# Patient Record
Sex: MALE | Race: WHITE | NOT HISPANIC OR LATINO
[De-identification: names, ages, dates, MRNs, and addresses within clinical notes are randomized per-mention and may not be internally consistent; named-entity substitution may affect disease eponyms.]

---

## 2016-02-18 RX ORDER — DOCUSATE SODIUM 100 MG
1 CAPSULE ORAL
Qty: 0 | Refills: 0 | COMMUNITY
Start: 2016-02-18

## 2017-03-02 ENCOUNTER — APPOINTMENT (OUTPATIENT)
Dept: OPHTHALMOLOGY | Facility: CLINIC | Age: 82
End: 2017-03-02

## 2017-09-14 ENCOUNTER — APPOINTMENT (OUTPATIENT)
Dept: OPHTHALMOLOGY | Facility: CLINIC | Age: 82
End: 2017-09-14
Payer: MEDICARE

## 2017-09-14 PROCEDURE — 99213 OFFICE O/P EST LOW 20 MIN: CPT

## 2017-11-08 ENCOUNTER — APPOINTMENT (OUTPATIENT)
Dept: SURGERY | Facility: CLINIC | Age: 82
End: 2017-11-08
Payer: MEDICARE

## 2017-11-08 VITALS — HEIGHT: 71 IN | TEMPERATURE: 98 F | BODY MASS INDEX: 22.68 KG/M2 | WEIGHT: 162 LBS

## 2017-11-08 VITALS — DIASTOLIC BLOOD PRESSURE: 66 MMHG | HEART RATE: 51 BPM | SYSTOLIC BLOOD PRESSURE: 114 MMHG

## 2017-11-08 PROCEDURE — 99202 OFFICE O/P NEW SF 15 MIN: CPT

## 2017-12-14 ENCOUNTER — APPOINTMENT (OUTPATIENT)
Dept: OPHTHALMOLOGY | Facility: CLINIC | Age: 82
End: 2017-12-14
Payer: MEDICARE

## 2017-12-14 DIAGNOSIS — H35.3130 NONEXUDATIVE AGE-RELATED MACULAR DEGENERATION, BILATERAL, STAGE UNSPECIFIED: ICD-10-CM

## 2017-12-14 PROCEDURE — 92015 DETERMINE REFRACTIVE STATE: CPT

## 2017-12-14 PROCEDURE — 92014 COMPRE OPH EXAM EST PT 1/>: CPT

## 2017-12-31 ENCOUNTER — INPATIENT (INPATIENT)
Facility: HOSPITAL | Age: 82
LOS: 2 days | End: 2018-01-03
Attending: STUDENT IN AN ORGANIZED HEALTH CARE EDUCATION/TRAINING PROGRAM | Admitting: STUDENT IN AN ORGANIZED HEALTH CARE EDUCATION/TRAINING PROGRAM
Payer: MEDICARE

## 2017-12-31 VITALS
DIASTOLIC BLOOD PRESSURE: 63 MMHG | RESPIRATION RATE: 18 BRPM | SYSTOLIC BLOOD PRESSURE: 96 MMHG | OXYGEN SATURATION: 97 % | HEART RATE: 72 BPM

## 2017-12-31 DIAGNOSIS — R73.9 HYPERGLYCEMIA, UNSPECIFIED: ICD-10-CM

## 2017-12-31 DIAGNOSIS — Z29.9 ENCOUNTER FOR PROPHYLACTIC MEASURES, UNSPECIFIED: ICD-10-CM

## 2017-12-31 DIAGNOSIS — A41.89 OTHER SPECIFIED SEPSIS: ICD-10-CM

## 2017-12-31 DIAGNOSIS — N17.9 ACUTE KIDNEY FAILURE, UNSPECIFIED: ICD-10-CM

## 2017-12-31 DIAGNOSIS — I50.43 ACUTE ON CHRONIC COMBINED SYSTOLIC (CONGESTIVE) AND DIASTOLIC (CONGESTIVE) HEART FAILURE: ICD-10-CM

## 2017-12-31 DIAGNOSIS — E03.9 HYPOTHYROIDISM, UNSPECIFIED: ICD-10-CM

## 2017-12-31 DIAGNOSIS — I44.7 LEFT BUNDLE-BRANCH BLOCK, UNSPECIFIED: ICD-10-CM

## 2017-12-31 DIAGNOSIS — J12.9 VIRAL PNEUMONIA, UNSPECIFIED: ICD-10-CM

## 2017-12-31 DIAGNOSIS — R53.83 OTHER FATIGUE: ICD-10-CM

## 2017-12-31 DIAGNOSIS — G93.41 METABOLIC ENCEPHALOPATHY: ICD-10-CM

## 2017-12-31 DIAGNOSIS — Z71.89 OTHER SPECIFIED COUNSELING: ICD-10-CM

## 2017-12-31 LAB
ALBUMIN SERPL ELPH-MCNC: 3.8 G/DL — SIGNIFICANT CHANGE UP (ref 3.3–5)
ALP SERPL-CCNC: 111 U/L — SIGNIFICANT CHANGE UP (ref 40–120)
ALT FLD-CCNC: 16 U/L — SIGNIFICANT CHANGE UP (ref 4–41)
ANISOCYTOSIS BLD QL: SLIGHT — SIGNIFICANT CHANGE UP
APPEARANCE UR: SIGNIFICANT CHANGE UP
APTT BLD: 28.3 SEC — SIGNIFICANT CHANGE UP (ref 27.5–37.4)
AST SERPL-CCNC: 27 U/L — SIGNIFICANT CHANGE UP (ref 4–40)
B PERT DNA SPEC QL NAA+PROBE: SIGNIFICANT CHANGE UP
B-OH-BUTYR SERPL-SCNC: 0.2 MMOL/L — SIGNIFICANT CHANGE UP (ref 0–0.4)
BASE EXCESS BLDV CALC-SCNC: -1.9 MMOL/L — SIGNIFICANT CHANGE UP
BASE EXCESS BLDV CALC-SCNC: -2.7 MMOL/L — SIGNIFICANT CHANGE UP
BASE EXCESS BLDV CALC-SCNC: -3.6 MMOL/L — SIGNIFICANT CHANGE UP
BASOPHILS # BLD AUTO: 0.04 K/UL — SIGNIFICANT CHANGE UP (ref 0–0.2)
BASOPHILS NFR BLD AUTO: 0.3 % — SIGNIFICANT CHANGE UP (ref 0–2)
BASOPHILS NFR SPEC: 0 % — SIGNIFICANT CHANGE UP (ref 0–2)
BILIRUB SERPL-MCNC: 0.5 MG/DL — SIGNIFICANT CHANGE UP (ref 0.2–1.2)
BILIRUB UR-MCNC: NEGATIVE — SIGNIFICANT CHANGE UP
BLASTS # FLD: 0 % — SIGNIFICANT CHANGE UP (ref 0–0)
BLOOD GAS VENOUS - CREATININE: 0.89 MG/DL — SIGNIFICANT CHANGE UP (ref 0.5–1.3)
BLOOD GAS VENOUS - CREATININE: 0.95 MG/DL — SIGNIFICANT CHANGE UP (ref 0.5–1.3)
BLOOD GAS VENOUS - CREATININE: 1.16 MG/DL — SIGNIFICANT CHANGE UP (ref 0.5–1.3)
BLOOD UR QL VISUAL: NEGATIVE — SIGNIFICANT CHANGE UP
BUN SERPL-MCNC: 30 MG/DL — HIGH (ref 7–23)
BUN SERPL-MCNC: 32 MG/DL — HIGH (ref 7–23)
C PNEUM DNA SPEC QL NAA+PROBE: NOT DETECTED — SIGNIFICANT CHANGE UP
CALCIUM SERPL-MCNC: 10.1 MG/DL — SIGNIFICANT CHANGE UP (ref 8.4–10.5)
CALCIUM SERPL-MCNC: 9.4 MG/DL — SIGNIFICANT CHANGE UP (ref 8.4–10.5)
CHLORIDE BLDV-SCNC: 102 MMOL/L — SIGNIFICANT CHANGE UP (ref 96–108)
CHLORIDE BLDV-SCNC: 103 MMOL/L — SIGNIFICANT CHANGE UP (ref 96–108)
CHLORIDE BLDV-SCNC: 99 MMOL/L — SIGNIFICANT CHANGE UP (ref 96–108)
CHLORIDE SERPL-SCNC: 95 MMOL/L — LOW (ref 98–107)
CHLORIDE SERPL-SCNC: 99 MMOL/L — SIGNIFICANT CHANGE UP (ref 98–107)
CK MB BLD-MCNC: 4.65 NG/ML — SIGNIFICANT CHANGE UP (ref 1–6.6)
CK MB BLD-MCNC: SIGNIFICANT CHANGE UP (ref 0–2.5)
CK SERPL-CCNC: 84 U/L — SIGNIFICANT CHANGE UP (ref 30–200)
CO2 SERPL-SCNC: 20 MMOL/L — LOW (ref 22–31)
CO2 SERPL-SCNC: 22 MMOL/L — SIGNIFICANT CHANGE UP (ref 22–31)
COLOR SPEC: YELLOW — SIGNIFICANT CHANGE UP
CREAT SERPL-MCNC: 1.03 MG/DL — SIGNIFICANT CHANGE UP (ref 0.5–1.3)
CREAT SERPL-MCNC: 1.22 MG/DL — SIGNIFICANT CHANGE UP (ref 0.5–1.3)
EOSINOPHIL # BLD AUTO: 0.01 K/UL — SIGNIFICANT CHANGE UP (ref 0–0.5)
EOSINOPHIL NFR BLD AUTO: 0.1 % — SIGNIFICANT CHANGE UP (ref 0–6)
EOSINOPHIL NFR FLD: 0 % — SIGNIFICANT CHANGE UP (ref 0–6)
FLUAV H1 2009 PAND RNA SPEC QL NAA+PROBE: NOT DETECTED — SIGNIFICANT CHANGE UP
FLUAV H1 RNA SPEC QL NAA+PROBE: NOT DETECTED — SIGNIFICANT CHANGE UP
FLUAV H3 RNA SPEC QL NAA+PROBE: NOT DETECTED — SIGNIFICANT CHANGE UP
FLUAV SUBTYP SPEC NAA+PROBE: SIGNIFICANT CHANGE UP
FLUBV RNA SPEC QL NAA+PROBE: NOT DETECTED — SIGNIFICANT CHANGE UP
GAS PNL BLDV: 130 MMOL/L — LOW (ref 136–146)
GAS PNL BLDV: 131 MMOL/L — LOW (ref 136–146)
GAS PNL BLDV: 133 MMOL/L — LOW (ref 136–146)
GIANT PLATELETS BLD QL SMEAR: PRESENT — SIGNIFICANT CHANGE UP
GLUCOSE BLDV-MCNC: 302 — HIGH (ref 70–99)
GLUCOSE BLDV-MCNC: 435 — HIGH (ref 70–99)
GLUCOSE BLDV-MCNC: 483 — CRITICAL HIGH (ref 70–99)
GLUCOSE SERPL-MCNC: 299 MG/DL — HIGH (ref 70–99)
GLUCOSE SERPL-MCNC: 472 MG/DL — CRITICAL HIGH (ref 70–99)
GLUCOSE UR-MCNC: >1000 — HIGH
HADV DNA SPEC QL NAA+PROBE: NOT DETECTED — SIGNIFICANT CHANGE UP
HCO3 BLDV-SCNC: 21 MMOL/L — SIGNIFICANT CHANGE UP (ref 20–27)
HCO3 BLDV-SCNC: 21 MMOL/L — SIGNIFICANT CHANGE UP (ref 20–27)
HCO3 BLDV-SCNC: 22 MMOL/L — SIGNIFICANT CHANGE UP (ref 20–27)
HCOV 229E RNA SPEC QL NAA+PROBE: NOT DETECTED — SIGNIFICANT CHANGE UP
HCOV HKU1 RNA SPEC QL NAA+PROBE: NOT DETECTED — SIGNIFICANT CHANGE UP
HCOV NL63 RNA SPEC QL NAA+PROBE: NOT DETECTED — SIGNIFICANT CHANGE UP
HCOV OC43 RNA SPEC QL NAA+PROBE: NOT DETECTED — SIGNIFICANT CHANGE UP
HCT VFR BLD CALC: 33.2 % — LOW (ref 39–50)
HCT VFR BLDV CALC: 29.7 % — LOW (ref 39–51)
HCT VFR BLDV CALC: 30.6 % — LOW (ref 39–51)
HCT VFR BLDV CALC: 33.7 % — LOW (ref 39–51)
HGB BLD-MCNC: 10.6 G/DL — LOW (ref 13–17)
HGB BLDV-MCNC: 10.9 G/DL — LOW (ref 13–17)
HGB BLDV-MCNC: 9.6 G/DL — LOW (ref 13–17)
HGB BLDV-MCNC: 9.9 G/DL — LOW (ref 13–17)
HMPV RNA SPEC QL NAA+PROBE: NOT DETECTED — SIGNIFICANT CHANGE UP
HPIV1 RNA SPEC QL NAA+PROBE: NOT DETECTED — SIGNIFICANT CHANGE UP
HPIV2 RNA SPEC QL NAA+PROBE: NOT DETECTED — SIGNIFICANT CHANGE UP
HPIV3 RNA SPEC QL NAA+PROBE: NOT DETECTED — SIGNIFICANT CHANGE UP
HPIV4 RNA SPEC QL NAA+PROBE: NOT DETECTED — SIGNIFICANT CHANGE UP
HYALINE CASTS # UR AUTO: SIGNIFICANT CHANGE UP (ref 0–?)
IMM GRANULOCYTES # BLD AUTO: 0.16 # — SIGNIFICANT CHANGE UP
IMM GRANULOCYTES NFR BLD AUTO: 1.2 % — SIGNIFICANT CHANGE UP (ref 0–1.5)
INR BLD: 1.19 — HIGH (ref 0.88–1.17)
KETONES UR-MCNC: NEGATIVE — SIGNIFICANT CHANGE UP
LACTATE BLDV-MCNC: 5.2 MMOL/L — CRITICAL HIGH (ref 0.5–2)
LACTATE BLDV-MCNC: 5.7 MMOL/L — CRITICAL HIGH (ref 0.5–2)
LACTATE BLDV-MCNC: 6.7 MMOL/L — CRITICAL HIGH (ref 0.5–2)
LEUKOCYTE ESTERASE UR-ACNC: HIGH
LYMPHOCYTES # BLD AUTO: 1.22 K/UL — SIGNIFICANT CHANGE UP (ref 1–3.3)
LYMPHOCYTES # BLD AUTO: 8.8 % — LOW (ref 13–44)
LYMPHOCYTES NFR SPEC AUTO: 8.1 % — LOW (ref 13–44)
M PNEUMO DNA SPEC QL NAA+PROBE: NOT DETECTED — SIGNIFICANT CHANGE UP
MACROCYTES BLD QL: SLIGHT — SIGNIFICANT CHANGE UP
MCHC RBC-ENTMCNC: 31 PG — SIGNIFICANT CHANGE UP (ref 27–34)
MCHC RBC-ENTMCNC: 31.9 % — LOW (ref 32–36)
MCV RBC AUTO: 97.1 FL — SIGNIFICANT CHANGE UP (ref 80–100)
METAMYELOCYTES # FLD: 0 % — SIGNIFICANT CHANGE UP (ref 0–1)
MONOCYTES # BLD AUTO: 1.92 K/UL — HIGH (ref 0–0.9)
MONOCYTES NFR BLD AUTO: 13.9 % — SIGNIFICANT CHANGE UP (ref 2–14)
MONOCYTES NFR BLD: 13.5 % — HIGH (ref 2–9)
MUCOUS THREADS # UR AUTO: SIGNIFICANT CHANGE UP
MYELOCYTES NFR BLD: 0 % — SIGNIFICANT CHANGE UP (ref 0–0)
NEUTROPHIL AB SER-ACNC: 77.5 % — HIGH (ref 43–77)
NEUTROPHILS # BLD AUTO: 10.51 K/UL — HIGH (ref 1.8–7.4)
NEUTROPHILS NFR BLD AUTO: 75.7 % — SIGNIFICANT CHANGE UP (ref 43–77)
NEUTS BAND # BLD: 0 % — SIGNIFICANT CHANGE UP (ref 0–6)
NITRITE UR-MCNC: NEGATIVE — SIGNIFICANT CHANGE UP
NRBC # FLD: 0 — SIGNIFICANT CHANGE UP
NT-PROBNP SERPL-SCNC: SIGNIFICANT CHANGE UP PG/ML
OTHER - HEMATOLOGY %: 0 — SIGNIFICANT CHANGE UP
PCO2 BLDV: 44 MMHG — SIGNIFICANT CHANGE UP (ref 41–51)
PCO2 BLDV: 45 MMHG — SIGNIFICANT CHANGE UP (ref 41–51)
PCO2 BLDV: 52 MMHG — HIGH (ref 41–51)
PH BLDV: 7.27 PH — LOW (ref 7.32–7.43)
PH BLDV: 7.32 PH — SIGNIFICANT CHANGE UP (ref 7.32–7.43)
PH BLDV: 7.34 PH — SIGNIFICANT CHANGE UP (ref 7.32–7.43)
PH UR: 6 — SIGNIFICANT CHANGE UP (ref 4.6–8)
PLATELET # BLD AUTO: 112 K/UL — LOW (ref 150–400)
PLATELET COUNT - ESTIMATE: SIGNIFICANT CHANGE UP
PMV BLD: 13 FL — SIGNIFICANT CHANGE UP (ref 7–13)
PO2 BLDV: 38 MMHG — SIGNIFICANT CHANGE UP (ref 35–40)
PO2 BLDV: 40 MMHG — SIGNIFICANT CHANGE UP (ref 35–40)
PO2 BLDV: 58 MMHG — HIGH (ref 35–40)
POLYCHROMASIA BLD QL SMEAR: SLIGHT — SIGNIFICANT CHANGE UP
POTASSIUM BLDV-SCNC: 4.8 MMOL/L — HIGH (ref 3.4–4.5)
POTASSIUM BLDV-SCNC: 5.3 MMOL/L — HIGH (ref 3.4–4.5)
POTASSIUM BLDV-SCNC: 6.3 MMOL/L — CRITICAL HIGH (ref 3.4–4.5)
POTASSIUM SERPL-MCNC: 4.9 MMOL/L — SIGNIFICANT CHANGE UP (ref 3.5–5.3)
POTASSIUM SERPL-MCNC: 5.9 MMOL/L — HIGH (ref 3.5–5.3)
POTASSIUM SERPL-SCNC: 4.9 MMOL/L — SIGNIFICANT CHANGE UP (ref 3.5–5.3)
POTASSIUM SERPL-SCNC: 5.9 MMOL/L — HIGH (ref 3.5–5.3)
PROMYELOCYTES # FLD: 0 % — SIGNIFICANT CHANGE UP (ref 0–0)
PROT SERPL-MCNC: 7.6 G/DL — SIGNIFICANT CHANGE UP (ref 6–8.3)
PROT UR-MCNC: 30 MG/DL — HIGH
PROTHROM AB SERPL-ACNC: 13.2 SEC — HIGH (ref 9.8–13.1)
RBC # BLD: 3.42 M/UL — LOW (ref 4.2–5.8)
RBC # FLD: 15.7 % — HIGH (ref 10.3–14.5)
RBC CASTS # UR COMP ASSIST: SIGNIFICANT CHANGE UP (ref 0–?)
REVIEW TO FOLLOW: YES — SIGNIFICANT CHANGE UP
RSV RNA SPEC QL NAA+PROBE: NOT DETECTED — SIGNIFICANT CHANGE UP
RV+EV RNA SPEC QL NAA+PROBE: NOT DETECTED — SIGNIFICANT CHANGE UP
SAO2 % BLDV: 54.1 % — LOW (ref 60–85)
SAO2 % BLDV: 54.4 % — LOW (ref 60–85)
SAO2 % BLDV: 81.4 % — SIGNIFICANT CHANGE UP (ref 60–85)
SODIUM SERPL-SCNC: 133 MMOL/L — LOW (ref 135–145)
SODIUM SERPL-SCNC: 135 MMOL/L — SIGNIFICANT CHANGE UP (ref 135–145)
SP GR SPEC: 1.03 — SIGNIFICANT CHANGE UP (ref 1–1.04)
SQUAMOUS # UR AUTO: SIGNIFICANT CHANGE UP
TROPONIN T SERPL-MCNC: 0.16 NG/ML — HIGH (ref 0–0.06)
TROPONIN T SERPL-MCNC: 0.18 NG/ML — HIGH (ref 0–0.06)
UROBILINOGEN FLD QL: NORMAL MG/DL — SIGNIFICANT CHANGE UP
VARIANT LYMPHS # BLD: 0.9 % — SIGNIFICANT CHANGE UP
WBC # BLD: 13.86 K/UL — HIGH (ref 3.8–10.5)
WBC # FLD AUTO: 13.86 K/UL — HIGH (ref 3.8–10.5)
WBC UR QL: SIGNIFICANT CHANGE UP (ref 0–?)

## 2017-12-31 PROCEDURE — 99223 1ST HOSP IP/OBS HIGH 75: CPT | Mod: GC

## 2017-12-31 PROCEDURE — 71010: CPT | Mod: 26

## 2017-12-31 PROCEDURE — 74177 CT ABD & PELVIS W/CONTRAST: CPT | Mod: 26

## 2017-12-31 PROCEDURE — 71260 CT THORAX DX C+: CPT | Mod: 26

## 2017-12-31 RX ORDER — LEVOTHYROXINE SODIUM 125 MCG
87 TABLET ORAL
Qty: 0 | Refills: 0 | Status: DISCONTINUED | OUTPATIENT
Start: 2018-01-01 | End: 2018-01-02

## 2017-12-31 RX ORDER — DOXAZOSIN MESYLATE 4 MG
2 TABLET ORAL AT BEDTIME
Qty: 0 | Refills: 0 | Status: DISCONTINUED | OUTPATIENT
Start: 2017-12-31 | End: 2017-12-31

## 2017-12-31 RX ORDER — AMPICILLIN SODIUM AND SULBACTAM SODIUM 250; 125 MG/ML; MG/ML
3 INJECTION, POWDER, FOR SUSPENSION INTRAMUSCULAR; INTRAVENOUS EVERY 12 HOURS
Qty: 0 | Refills: 0 | Status: DISCONTINUED | OUTPATIENT
Start: 2018-01-01 | End: 2018-01-02

## 2017-12-31 RX ORDER — IPRATROPIUM/ALBUTEROL SULFATE 18-103MCG
3 AEROSOL WITH ADAPTER (GRAM) INHALATION ONCE
Qty: 0 | Refills: 0 | Status: COMPLETED | OUTPATIENT
Start: 2017-12-31 | End: 2017-12-31

## 2017-12-31 RX ORDER — AMPICILLIN SODIUM AND SULBACTAM SODIUM 250; 125 MG/ML; MG/ML
3 INJECTION, POWDER, FOR SUSPENSION INTRAMUSCULAR; INTRAVENOUS ONCE
Qty: 0 | Refills: 0 | Status: COMPLETED | OUTPATIENT
Start: 2017-12-31 | End: 2017-12-31

## 2017-12-31 RX ORDER — GUAIFENESIN/DEXTROMETHORPHAN 600MG-30MG
1 TABLET, EXTENDED RELEASE 12 HR ORAL
Qty: 0 | Refills: 0 | COMMUNITY

## 2017-12-31 RX ORDER — MAGNESIUM OXIDE 400 MG ORAL TABLET 241.3 MG
1 TABLET ORAL
Qty: 0 | Refills: 0 | COMMUNITY

## 2017-12-31 RX ORDER — INSULIN LISPRO 100/ML
VIAL (ML) SUBCUTANEOUS EVERY 6 HOURS
Qty: 0 | Refills: 0 | Status: DISCONTINUED | OUTPATIENT
Start: 2017-12-31 | End: 2018-01-01

## 2017-12-31 RX ORDER — AZITHROMYCIN 500 MG/1
500 TABLET, FILM COATED ORAL EVERY 24 HOURS
Qty: 0 | Refills: 0 | Status: DISCONTINUED | OUTPATIENT
Start: 2018-01-01 | End: 2018-01-03

## 2017-12-31 RX ORDER — FAMOTIDINE 10 MG/ML
20 INJECTION INTRAVENOUS EVERY 12 HOURS
Qty: 0 | Refills: 0 | Status: DISCONTINUED | OUTPATIENT
Start: 2017-12-31 | End: 2018-01-02

## 2017-12-31 RX ORDER — INSULIN HUMAN 100 [IU]/ML
10 INJECTION, SOLUTION SUBCUTANEOUS ONCE
Qty: 0 | Refills: 0 | Status: DISCONTINUED | OUTPATIENT
Start: 2017-12-31 | End: 2017-12-31

## 2017-12-31 RX ORDER — ASPIRIN/CALCIUM CARB/MAGNESIUM 324 MG
1 TABLET ORAL
Qty: 0 | Refills: 0 | COMMUNITY

## 2017-12-31 RX ORDER — FINASTERIDE 5 MG/1
5 TABLET, FILM COATED ORAL DAILY
Qty: 0 | Refills: 0 | Status: DISCONTINUED | OUTPATIENT
Start: 2017-12-31 | End: 2017-12-31

## 2017-12-31 RX ORDER — SODIUM CHLORIDE 9 MG/ML
1000 INJECTION INTRAMUSCULAR; INTRAVENOUS; SUBCUTANEOUS ONCE
Qty: 0 | Refills: 0 | Status: COMPLETED | OUTPATIENT
Start: 2017-12-31 | End: 2017-12-31

## 2017-12-31 RX ORDER — VANCOMYCIN HCL 1 G
1000 VIAL (EA) INTRAVENOUS ONCE
Qty: 0 | Refills: 0 | Status: COMPLETED | OUTPATIENT
Start: 2017-12-31 | End: 2017-12-31

## 2017-12-31 RX ORDER — SODIUM CHLORIDE 9 MG/ML
1000 INJECTION, SOLUTION INTRAVENOUS
Qty: 0 | Refills: 0 | Status: DISCONTINUED | OUTPATIENT
Start: 2017-12-31 | End: 2018-01-03

## 2017-12-31 RX ORDER — AMPICILLIN SODIUM AND SULBACTAM SODIUM 250; 125 MG/ML; MG/ML
INJECTION, POWDER, FOR SUSPENSION INTRAMUSCULAR; INTRAVENOUS
Qty: 0 | Refills: 0 | Status: DISCONTINUED | OUTPATIENT
Start: 2017-12-31 | End: 2018-01-02

## 2017-12-31 RX ORDER — CALCIUM GLUCONATE 100 MG/ML
1 VIAL (ML) INTRAVENOUS ONCE
Qty: 0 | Refills: 0 | Status: COMPLETED | OUTPATIENT
Start: 2017-12-31 | End: 2017-12-31

## 2017-12-31 RX ORDER — DEXTROSE 50 % IN WATER 50 %
1 SYRINGE (ML) INTRAVENOUS ONCE
Qty: 0 | Refills: 0 | Status: DISCONTINUED | OUTPATIENT
Start: 2017-12-31 | End: 2018-01-03

## 2017-12-31 RX ORDER — AZITHROMYCIN 500 MG/1
500 TABLET, FILM COATED ORAL ONCE
Qty: 0 | Refills: 0 | Status: COMPLETED | OUTPATIENT
Start: 2017-12-31 | End: 2017-12-31

## 2017-12-31 RX ORDER — ASPIRIN/CALCIUM CARB/MAGNESIUM 324 MG
162 TABLET ORAL ONCE
Qty: 0 | Refills: 0 | Status: COMPLETED | OUTPATIENT
Start: 2017-12-31 | End: 2017-12-31

## 2017-12-31 RX ORDER — INSULIN HUMAN 100 [IU]/ML
10 INJECTION, SOLUTION SUBCUTANEOUS ONCE
Qty: 0 | Refills: 0 | Status: COMPLETED | OUTPATIENT
Start: 2017-12-31 | End: 2017-12-31

## 2017-12-31 RX ORDER — DEXTROSE 50 % IN WATER 50 %
25 SYRINGE (ML) INTRAVENOUS ONCE
Qty: 0 | Refills: 0 | Status: DISCONTINUED | OUTPATIENT
Start: 2017-12-31 | End: 2018-01-03

## 2017-12-31 RX ORDER — IPRATROPIUM/ALBUTEROL SULFATE 18-103MCG
3 AEROSOL WITH ADAPTER (GRAM) INHALATION EVERY 6 HOURS
Qty: 0 | Refills: 0 | Status: DISCONTINUED | OUTPATIENT
Start: 2017-12-31 | End: 2018-01-03

## 2017-12-31 RX ORDER — AZITHROMYCIN 500 MG/1
TABLET, FILM COATED ORAL
Qty: 0 | Refills: 0 | Status: DISCONTINUED | OUTPATIENT
Start: 2017-12-31 | End: 2018-01-03

## 2017-12-31 RX ORDER — ENOXAPARIN SODIUM 100 MG/ML
40 INJECTION SUBCUTANEOUS EVERY 24 HOURS
Qty: 0 | Refills: 0 | Status: DISCONTINUED | OUTPATIENT
Start: 2017-12-31 | End: 2018-01-03

## 2017-12-31 RX ORDER — FUROSEMIDE 40 MG
40 TABLET ORAL DAILY
Qty: 0 | Refills: 0 | Status: DISCONTINUED | OUTPATIENT
Start: 2017-12-31 | End: 2017-12-31

## 2017-12-31 RX ORDER — GLUCAGON INJECTION, SOLUTION 0.5 MG/.1ML
1 INJECTION, SOLUTION SUBCUTANEOUS ONCE
Qty: 0 | Refills: 0 | Status: DISCONTINUED | OUTPATIENT
Start: 2017-12-31 | End: 2018-01-03

## 2017-12-31 RX ORDER — DEXTROSE 50 % IN WATER 50 %
12.5 SYRINGE (ML) INTRAVENOUS ONCE
Qty: 0 | Refills: 0 | Status: DISCONTINUED | OUTPATIENT
Start: 2017-12-31 | End: 2018-01-03

## 2017-12-31 RX ORDER — PANTOPRAZOLE SODIUM 20 MG/1
40 TABLET, DELAYED RELEASE ORAL DAILY
Qty: 0 | Refills: 0 | Status: DISCONTINUED | OUTPATIENT
Start: 2017-12-31 | End: 2018-01-02

## 2017-12-31 RX ORDER — PIPERACILLIN AND TAZOBACTAM 4; .5 G/20ML; G/20ML
3.38 INJECTION, POWDER, LYOPHILIZED, FOR SOLUTION INTRAVENOUS ONCE
Qty: 0 | Refills: 0 | Status: COMPLETED | OUTPATIENT
Start: 2017-12-31 | End: 2017-12-31

## 2017-12-31 RX ORDER — LANOLIN ALCOHOL/MO/W.PET/CERES
1 CREAM (GRAM) TOPICAL
Qty: 0 | Refills: 0 | COMMUNITY

## 2017-12-31 RX ADMIN — Medication 3 MILLILITER(S): at 16:30

## 2017-12-31 RX ADMIN — SODIUM CHLORIDE 1000 MILLILITER(S): 9 INJECTION INTRAMUSCULAR; INTRAVENOUS; SUBCUTANEOUS at 14:15

## 2017-12-31 RX ADMIN — Medication 200 GRAM(S): at 13:50

## 2017-12-31 RX ADMIN — Medication 162 MILLIGRAM(S): at 13:35

## 2017-12-31 RX ADMIN — INSULIN HUMAN 10 UNIT(S): 100 INJECTION, SOLUTION SUBCUTANEOUS at 13:36

## 2017-12-31 RX ADMIN — ENOXAPARIN SODIUM 40 MILLIGRAM(S): 100 INJECTION SUBCUTANEOUS at 22:17

## 2017-12-31 RX ADMIN — PIPERACILLIN AND TAZOBACTAM 200 GRAM(S): 4; .5 INJECTION, POWDER, LYOPHILIZED, FOR SOLUTION INTRAVENOUS at 14:06

## 2017-12-31 RX ADMIN — AZITHROMYCIN 250 MILLIGRAM(S): 500 TABLET, FILM COATED ORAL at 20:47

## 2017-12-31 RX ADMIN — Medication 3 MILLILITER(S): at 23:00

## 2017-12-31 RX ADMIN — SODIUM CHLORIDE 1000 MILLILITER(S): 9 INJECTION INTRAMUSCULAR; INTRAVENOUS; SUBCUTANEOUS at 12:13

## 2017-12-31 RX ADMIN — Medication 250 MILLIGRAM(S): at 12:55

## 2017-12-31 RX ADMIN — AMPICILLIN SODIUM AND SULBACTAM SODIUM 200 GRAM(S): 250; 125 INJECTION, POWDER, FOR SUSPENSION INTRAMUSCULAR; INTRAVENOUS at 22:17

## 2017-12-31 RX ADMIN — Medication 4: at 23:30

## 2017-12-31 NOTE — H&P ADULT - HISTORY OF PRESENT ILLNESS
89M with CAD s/p CABG x4 (1988) c/b ICM 89M with CAD s/p CABG x4 (1988) c/b HFrEF (5/2013 TTE EF 40-45%, mild MR, mod AS, mod LA, reversal of E-A, mod TR), HTN, HLD, hypothyroid, colon CA s/p resection who was brought in by daughter for abnormal breathing that developed today.      WBC 13.86 (ANC 10.5 75.7%) Hb 10.6 Plt 112  K 5.9 --> 4.9 s/p insulin 10 IVP, Duoneb x2  Cr 1.22 (baseline 0.7) Glu 472    BNP Note: patient lethargic/somnolent and so history obtained from daughter (HCP, Theresa) and HHA Marques.    89M with CAD s/p CABG x4 (1988) c/b HFrEF (5/2013 TTE EF 40-45%, mild MR, mod AS, mod LA, reversal of E-A, mod TR), HTN, HLD, hypothyroid, colon CA s/p resection, internal hemorrhoids who was brought in by daughter for abnormal breathing that developed today.  Patient for the past few days has complained of feeling poorly, but otherwise was at baseline health.  Patient is wheelchair bound s/p vertebral OM 4 years ago and is attended by 24h HHA.  He was noted last night to have NBNB N/V x1 with vomitus consisting of food and fluid.  PO was noted to be decreased yesterday as well.  This morning, he felt hungry, so ate breakfast consisting of bagel and orange juice at 8:30am.  Patient then was noted by his HHA Marques to be increasing lethargic and then started to develop labored breathing with daughter notified and EMS was called.  In the ED, patient noted to be wheezing and was placed on NC 2L.  Baseline productive cough with mucoid sputum (on Mucinex DM BID).  No F/C, runny/stuffy nose, sore throat, abd pain, diarrhea, constipation, dysuria, urinary frequency, malodorous urine, rashes.    WBC 13.86 (ANC 10.5 75.7%) Hb 10.6 Plt 112  K 5.9 --> 4.9 s/p insulin 10 IVP, Duoneb x2  Cr 1.22 (baseline 0.7) Glu 472 BHB 0.2 lactate 6.7 --> 5.2 s/p NS 2L  Trop T 0.18 --> 0.16   BNP 31841  EKG new LBBB  Cards consult called for new LBBB and troponin elevation: likely 2/2 demand ischemia 2/2 sepsis and would not treat ACS at this time Note: patient lethargic/somnolent and so history obtained from daughter (HCP, Theresa) and HHA Marques.    89M with CAD s/p CABG x4 (1988) c/b HFrEF (5/2013 TTE EF 40-45%, mild MR, mod AS, mod LA, reversal of E-A, mod TR), HTN, HLD, hypothyroid, colon CA s/p resection, internal hemorrhoids who was brought in by daughter for abnormal breathing that developed today.  Patient for the past few days has complained of feeling poorly, but otherwise was at baseline health.  Patient is wheelchair bound s/p vertebral OM 4 years ago and is attended by 24h HHA.  He was noted last night to have NBNB N/V x1 with vomitus consisting of food and fluid.  PO was noted to be decreased yesterday as well.  This morning, he felt hungry, so ate breakfast consisting of bagel and orange juice at 8:30am.  Patient then was noted by his HHA Marques to be increasing lethargic and then started to develop labored breathing with daughter notified and EMS was called.  In the ED, patient noted to be wheezing and was placed on NC 2L.  Baseline productive cough with mucoid sputum (on Mucinex DM BID) -- previously worked up by ENT and likely 2/2 kyphosis.  No F/C, runny/stuffy nose, sore throat, abd pain, diarrhea, constipation, dysuria, urinary frequency, malodorous urine, rashes.    Of note, patient's wife of 75 years recently passed on 11/9/17.    WBC 13.86 (ANC 10.5 75.7%) Hb 10.6 Plt 112  K 5.9 --> 4.9 s/p insulin 10 IVP, Duoneb x2  Cr 1.22 (baseline 0.7) Glu 472 BHB 0.2 lactate 6.7 --> 5.2 s/p NS 2L  Trop T 0.18 --> 0.16   BNP 97361  EKG new LBBB  Cards consult called for new LBBB and troponin elevation: likely 2/2 demand ischemia 2/2 sepsis and would not treat ACS at this time Note: patient lethargic/somnolent and so history obtained from daughter (HCP, Theresa) and HHA Marques.    89M with CAD s/p CABG x4 (1988) c/b HFrEF (5/2013 TTE EF 40-45%, mild MR, mod AS, mod LA, reversal of E-A, mod TR), HTN, HLD, hypothyroid, colon CA s/p resection, internal hemorrhoids who was brought in by daughter for labored breathing that developed today.  Patient for the past few days has complained of feeling poorly, but otherwise was at baseline health.  Patient is wheelchair bound s/p vertebral OM 4 years ago and is attended by 24h HHA.  He was noted last night to have NBNB N/V x1 with vomitus consisting of food and fluid.  PO was noted to be decreased yesterday as well.  This morning, he felt hungry, so ate breakfast consisting of bagel and orange juice at 8:30am.  Patient then was noted by his HHA Marques to be increasing lethargic and then started to develop labored breathing with daughter notified and EMS was called.  In the ED, patient noted to be wheezing and was placed on NC 2L.  Baseline productive cough with mucoid sputum (on Mucinex DM BID) -- previously worked up by ENT and likely 2/2 kyphosis.  No F/C, runny/stuffy nose, sore throat, abd pain, diarrhea, constipation, dysuria, urinary frequency, malodorous urine, rashes.    Of note, patient's wife of 75 years recently passed on 11/9/17.    WBC 13.86 (ANC 10.5 75.7%) Hb 10.6 Plt 112  K 5.9 --> 4.9 s/p insulin 10 IVP, Duoneb x2  Cr 1.22 (baseline 0.7) Glu 472 BHB 0.2 lactate 6.7 --> 5.2 s/p NS 2L UA(-) RVP(-)  Trop T 0.18 --> 0.16   BNP 62291  CT C/A/P pulmonary edema  EKG new LBBB  Cards consult called for new LBBB and troponin elevation: likely 2/2 demand ischemia 2/2 sepsis and would not treat ACS at this time Note: patient lethargic/somnolent and so history obtained from daughter (HCP, Theresa) and HHA Marques.    89M with CAD s/p CABG x4 (1988) c/b HFrEF (5/2013 TTE EF 40-45%, mild MR, mod AS, mod LA, reversal of E-A, mod TR), HTN, HLD, hypothyroid, colon CA s/p resection, internal hemorrhoids who was brought in by daughter for labored breathing that developed today.  Patient for the past few days has complained of feeling poorly, but otherwise was at baseline health.  Patient is wheelchair bound s/p vertebral OM 4 years ago and is attended by 24h HHA.  He was noted last night to have NBNB N/V x1 with vomitus consisting of food and fluid.  PO was noted to be decreased yesterday as well.  This morning, he felt hungry, so ate breakfast consisting of bagel and orange juice at 8:30am.    Patient then was noted by his HHA Marques to be increasing lethargic and then started to develop labored breathing with daughter notified and EMS was called.  In the ED, patient noted to be wheezing and was placed on NC 2L.  Baseline productive cough with mucoid sputum (on Mucinex DM BID) -- previously worked up by ENT and likely 2/2 kyphosis.  No F/C, runny/stuffy nose, sore throat, abd pain, diarrhea, constipation, dysuria, urinary frequency, malodorous urine, rashes.    Of note, patient's wife of 75 years recently passed on 11/9/17.    WBC 13.86 (ANC 10.5 75.7%) Hb 10.6 Plt 112  K 5.9 --> 4.9 s/p insulin 10 IVP, Duoneb x2  Cr 1.22 (baseline 0.7) Glu 472 BHB 0.2 lactate 6.7 --> 5.2 s/p NS 2L UA(-) RVP(-)  Trop T 0.18 --> 0.16   BNP 96025  CT C/A/P pulmonary edema  EKG new LBBB  Cards consult called for new LBBB and troponin elevation: likely 2/2 demand ischemia 2/2 sepsis and would not treat ACS at this time

## 2017-12-31 NOTE — H&P ADULT - PROBLEM SELECTOR PLAN 5
admission Cr 1.22 with baseline 0.7, now downtrending s/p fluids; likely prerenal 2/2 poor PO intake and cardiorenal  - with  in setting of h/o preDM and sepsis, downtrending s/p NS 2L  - start low dose HISS q6 as patient NPO new LBBB in setting of h/o CAD s/p CABG  - per Cards consult: no ACS work up as this time as troponins elevated in setting of sepsis  - troponins now downtrending  - monitor on Tele

## 2017-12-31 NOTE — H&P ADULT - ATTENDING COMMENTS
Patient seen and examined, d/w HS, agree with above.     89 yo M with multiple comorbidities presenting with lethargy, labored breathing. With leukocytosis, lactic acidosis, hyperglycemia, hyperkalemia (improved), KIRTI, elevated troponin and new LBBB, elevated proBNP, CT scan with pulmonary edema. Covering with empiric antibiotics pending culture results (team to clarify allergy history with PMD, daughter believes that patient's wife allergies were entered in error), monitor respiratory and fluid status closely, per d/w daughter (HCP) patient is DNR/DNI, would avoid overly aggressive measures... Overall prognosis poor...

## 2017-12-31 NOTE — ED PROVIDER NOTE - ATTENDING CONTRIBUTION TO CARE
89 y/o m bibems from home for lethargy/sob/abd pain. Patient at baseline ao2, c/o abd pain 3 days ago, nonfocal/nonspecific, has not had it since. Aid and daughter noticed yesterday and today he has been increasingly more lethargic, falling asleep when not being spoken to, and breathing hard at times. No associated fevers, chills, cough, vomiting, diarrhea. Patient providing ros.  exam  GEN - ao2, answers questions appropriately (hard of hearing)  HEAD - NC/AT   EYES- PERRL, EOMI  ENT: Airway patent, dry mm, Oral cavity and pharynx normal.   PULMONARY - CTA b/l, symmetric breath sounds, no e/o resp distress.   CARDIAC -s1s2, RRR, no M,G,R  ABDOMEN - +BS, ND, NT, soft, no guarding, no rebound, no masses   BACK - no CVA tenderness, No deformity  EXTREMITIES - FROM, symmetric pulses, no edema   SKIN - no rash or bruising   NEUROLOGIC - alert, oriented x 2, hearing difficulty (chronic) speech clear, no focal deficits  PSYCH -nl mood/affect, nl insight.  a/p-lethargy, intermittent diff breathing, ep of abd pain now resolved, awake/alert, talking, nontender abdomen, dnr/dni per advance directives confirmed with daughter and on paper, will check labs, ekg, cxr, ua, fluids, monitor, reass.

## 2017-12-31 NOTE — H&P ADULT - PROBLEM SELECTOR PLAN 8
- HSQ for DVT ppx in setting of KIRTI  - aspiration and fall precautions  - NPO for now 2/2 lethargy  - PT consult as patient lethargic, will give IV equivalent of home Synthroid 137

## 2017-12-31 NOTE — ED ADULT NURSE NOTE - CAS EDN DISCHARGE ASSESSMENT
A & O x 2. Periods of distressed respirations noted. MD called and notified. Report given to JULIANE Shelton

## 2017-12-31 NOTE — H&P ADULT - PROBLEM SELECTOR PLAN 10
- 2/2 infection, hyperglycemia, KIRTI  - treat underlying causes, monitor mental status    # functional quadriplegia  due to current medical condition, baseline wheelchair bound Daughter Theresa Estevez is HCP and POA with patient being DNR/DNI with paperwork in the chart.  IVF, antibiotics, and other medical management acceptable.

## 2017-12-31 NOTE — H&P ADULT - PROBLEM SELECTOR PLAN 4
with  in setting of h/o preDM  - start low dose HISS q6 as patient NPO new LBBB in setting of h/o CAD s/p CABG new LBBB in setting of h/o CAD s/p CABG  - per Cards consult: no ACS work up as this time as troponins elevated in setting of sepsis  - troponins now downtrending - 2/2 infection, hyperglycemia, KIRTI  - treat underlying causes, monitor mental status    # functional quadriplegia  due to current medical condition, baseline wheelchair bound

## 2017-12-31 NOTE — ED PROVIDER NOTE - MEDICAL DECISION MAKING DETAILS
Pt is a 89 y/o M PMHx HTN, CAD, h/o CABG, hypothyroidism, BPH brought by aid and daughter for increased lethargy and abnormal respirations x 2 days -- possible DKA, dehydration, ? sepsis, ? UTI -- labs, blood gas, CXr, UA, UCX, iv fluids

## 2017-12-31 NOTE — PATIENT PROFILE ADULT. - ABILITY TO HEAR (WITH HEARING AID OR HEARING APPLIANCE IF NORMALLY USED):
Mildly to Moderately Impaired: difficulty hearing in some environments or speaker may need to increase volume or speak distinctly/right ear hearing loss

## 2017-12-31 NOTE — ED ADULT NURSE NOTE - PMH
Aortic stenosis, moderate  on echo in 5/12  Arthritis    Borderline Diabetes Mellitus    BPH (Benign Prostatic Hypertrophy)    CAD (coronary artery disease)    Colon Cancer    Dyslipidemia    GERD (Gastroesophageal Reflux Disease)    Hard of Hearing    HTN (Hypertension)    Hypothyroidism    Lower Extremity Edema  Chronic

## 2017-12-31 NOTE — H&P ADULT - NSHPLABSRESULTS_GEN_ALL_CORE
10.6   13.86 )-----------( 112      ( 31 Dec 2017 12:00 )             33.2           135  |  99  |  30<H>  ----------------------------<  299<H>  4.9   |  22  |  1.03    Ca    9.4      31 Dec 2017 16:15    TPro  7.6  /  Alb  3.8  /  TBili  0.5  /  DBili  x   /  AST  27  /  ALT  16  /  AlkPhos  111        CARDIAC MARKERS ( 31 Dec 2017 16:15 )  x     / 0.16 ng/mL / x     / x     / x      CARDIAC MARKERS ( 31 Dec 2017 12:00 )  x     / 0.18 ng/mL / 84 u/L / 4.65 ng/mL / x        LIVER FUNCTIONS - ( 31 Dec 2017 12:00 )  Alb: 3.8 g/dL / Pro: 7.6 g/dL / ALK PHOS: 111 u/L / ALT: 16 u/L / AST: 27 u/L / GGT: x           PT/INR - ( 31 Dec 2017 12:00 )   PT: 13.2 SEC;   INR: 1.19     PTT - ( 31 Dec 2017 12:00 )  PTT:28.3 SEC    Urinalysis Basic - ( 31 Dec 2017 16:15 )  Color: YELLOW / Appearance: HAZY / S.028 / pH: 6.0  Gluc: >1000 / Ketone: NEGATIVE  / Bili: NEGATIVE / Urobili: NORMAL mg/dL   Blood: NEGATIVE / Protein: 30 mg/dL / Nitrite: NEGATIVE   Leuk Esterase: SMALL / RBC: 2-5 / WBC 2-5   Sq Epi: OCC / Non Sq Epi: x / Bacteria: x    < from: CT Chest/Abdomen/Pelvis w/ IV Cont (17 @ 16:03) >  Interlobular septal thickening and bilateral pleural effusions,   suggesting mild pulmonary edema.    Hyperdense contents in the stomach; correlate clinically for potential GI   bleeding.  < end of copied text >    < from: Xray Chest 1 View AP- PORTABLE-Urgent (17 @ 13:00) >  Low lung volumes.    Diffuse bilateral interstitial prominence could be related to   interstitial edema versus interstitial lung disease. No focal patchy   airspace consolidations, pleural effusions, or pneumothorax.    Stable sternal wire configuration, mediastinal clips, and cardiac and   mediastinal silhouettes including aortic calcifications.    Trachea midline.    Generalized osteopenia again noted.  < end of copied text > 10.6   13.86 )-----------( 112      ( 31 Dec 2017 12:00 )             33.2           135  |  99  |  30<H>  ----------------------------<  299<H>  4.9   |  22  |  1.03    Ca    9.4      31 Dec 2017 16:15    TPro  7.6  /  Alb  3.8  /  TBili  0.5  /  DBili  x   /  AST  27  /  ALT  16  /  AlkPhos  111        CARDIAC MARKERS ( 31 Dec 2017 16:15 )  x     / 0.16 ng/mL / x     / x     / x      CARDIAC MARKERS ( 31 Dec 2017 12:00 )  x     / 0.18 ng/mL / 84 u/L / 4.65 ng/mL / x        LIVER FUNCTIONS - ( 31 Dec 2017 12:00 )  Alb: 3.8 g/dL / Pro: 7.6 g/dL / ALK PHOS: 111 u/L / ALT: 16 u/L / AST: 27 u/L / GGT: x           PT/INR - ( 31 Dec 2017 12:00 )   PT: 13.2 SEC;   INR: 1.19     PTT - ( 31 Dec 2017 12:00 )  PTT:28.3 SEC    Urinalysis Basic - ( 31 Dec 2017 16:15 )  Color: YELLOW / Appearance: HAZY / S.028 / pH: 6.0  Gluc: >1000 / Ketone: NEGATIVE  / Bili: NEGATIVE / Urobili: NORMAL mg/dL   Blood: NEGATIVE / Protein: 30 mg/dL / Nitrite: NEGATIVE   Leuk Esterase: SMALL / RBC: 2-5 / WBC 2-5   Sq Epi: OCC / Non Sq Epi: x / Bacteria: x    EKG 66, LBBB    < from: CT Chest/Abdomen/Pelvis w/ IV Cont (17 @ 16:03) >  Interlobular septal thickening and bilateral pleural effusions,   suggesting mild pulmonary edema.    Hyperdense contents in the stomach; correlate clinically for potential GI   bleeding.  < end of copied text >    < from: Xray Chest 1 View AP- PORTABLE-Urgent (17 @ 13:00) >  Low lung volumes.    Diffuse bilateral interstitial prominence could be related to   interstitial edema versus interstitial lung disease. No focal patchy   airspace consolidations, pleural effusions, or pneumothorax.    Stable sternal wire configuration, mediastinal clips, and cardiac and   mediastinal silhouettes including aortic calcifications.    Trachea midline.    Generalized osteopenia again noted.  < end of copied text >

## 2017-12-31 NOTE — H&P ADULT - ASSESSMENT
89M with CAD s/p CABG x4 (1988) c/b HFrEF (5/2013 TTE EF 40-45%, mild MR, mod AS, mod LA, reversal of E-A, mod TR), HTN, HLD, hypothyroid, colon CA s/p resection, internal hemorrhoids who was brought in by daughter for labored breathing found to be in sepsis likely 2/2 viral infection despite RVP(-) c/b acute on chronic HFrEF exacerbation.

## 2017-12-31 NOTE — ED PROVIDER NOTE - CARE PLAN
Principal Discharge DX:	Lethargy  Secondary Diagnosis:	Pulmonary edema  Secondary Diagnosis:	Hyperglycemia

## 2017-12-31 NOTE — ED ADULT TRIAGE NOTE - CHIEF COMPLAINT QUOTE
pt lives with 24/7 aide daughter at side now  daughter states pt has not been feeling well for past few days  pt tod daughter he had a stomach ache   and has become increasingly lethargic and more lethargic today  states he was breathing irregularly  pt non verbal at tiage lethargic but arousable pt does have advanced directives finger stick [id number]  pt had a bagel and oj for breakfast at 930 unable to get temp at triage using oral and tympanic  family denies fevers

## 2017-12-31 NOTE — ED PROVIDER NOTE - OBJECTIVE STATEMENT
Pt is a 89 y/o M PMHx HTN, CAD, h/o CABG, hypothyroidism, BPH Pt is a 89 y/o M PMHx HTN, CAD, h/o CABG, hypothyroidism, BPH brought by aid and daughter for increased lethargy and abnormal respirations x 2 days.  Pt is alert and oriented at baseline.  Pt brought in for gradual onset, worsening lethargy associated with periods of rapid breathing lasting for a few breaths.  Pt had complaints of abdominal pain three days ago.  Pt's aid notes one episode of vomiting last night, containing food debris.  Last BM overnight, normal, nonbloody, no black stools.  No fevers, chills, chest pain, dysuria, cloudy urine, flank pain, headache, head trauma.

## 2017-12-31 NOTE — ED ADULT NURSE NOTE - PSH
Cataract extraction status of eye  B/L eyes  History of Colon Resection  For Colon cancer 3 years back.  S/P CABG X 4  1988  Umbilical Hernia  repaired

## 2017-12-31 NOTE — H&P ADULT - NSHPREVIEWOFSYSTEMS_GEN_ALL_CORE
limited as patient lethargic; ROS obtained from daughter and HHA at bedside  CONSTITUTIONAL: +weakness, No fevers or chills  EYES/ENT: No visual changes;  No vertigo or throat pain   NECK: No pain or stiffness  RESPIRATORY: +wheezing, No cough, hemoptysis; No shortness of breath  CARDIOVASCULAR: No chest pain or palpitations  GASTROINTESTINAL: +N/V, baseline BRBPR (h/o internal hemorrhoids), No abdominal or epigastric pain. No hematemesis; No diarrhea or constipation. No melena or hematochezia.  GENITOURINARY: No dysuria, frequency or hematuria  NEUROLOGICAL: No numbness or weakness  SKIN: No itching, burning, rashes, or lesions   All other review of systems is negative unless indicated above.

## 2017-12-31 NOTE — CONSULT NOTE ADULT - ASSESSMENT
89 year old male with PMH of CABG 30 years ago, Aortic stensosis, CAD, HTN ,DM ,Colon ca, p/w lethargy to the ER. Cardiology consulted regarding LBBB on EKG and troponin of 0.18. Patient with no active chest pain or SOB, no evidence of active ischemia currently. Patient denies chest pain. Patient has respiratory distress secondary to wheeze and is benefitting from respiratory treatments. Would not treat for ACS at this time. CT scan reveals possible GI Bleeding. Continue to treat for sepsis and possible MICU consultation. Plan discussed with

## 2017-12-31 NOTE — ED ADULT NURSE NOTE - OBJECTIVE STATEMENT
Pt received to rm 23 with daughter and HHA, a&ox2 (disoriented to time) currently at baseline mental status as per aide, brought in by daughter for labored breathing x the past few days worsening this morning and weakness. Pt denies any n/v, chest pain, fevers, or discomfort. Labs drawn and sent, IVL placed, IVF infusing. FS noted in triage. EKG performed - NSR on cm. Skin intact. PA/MD by bedside for eval. Will monitor.

## 2017-12-31 NOTE — H&P ADULT - PROBLEM SELECTOR PLAN 1
in setting of h/o CAD s/p CABG x4 in 1988 with pulmonary edema on CT chest and proBNP 27,000  - satting 100% on NC 2L; c/w supplemental O2 PRN  - soft BP, no home antihypertensives  - hold home statin and ASA81 in setting of lethargy  - strict I/Os, daily weights in setting of h/o CAD s/p CABG x4 in 1988 with pulmonary edema on CT chest and proBNP 27,000  - satting 100% on NC 2L; c/w supplemental O2 PRN  - soft BP, no home antihypertensives  - hold home statin and ASA81 in setting of lethargy  - strict I/Os, daily weights  - hold diuresis in setting of sepsis with hypotension in setting of h/o CAD s/p CABG x4 in 1988 with pulmonary edema on CT chest and proBNP 27,000  - satting 100% on NC 2L; c/w supplemental O2 PRN  - soft BP, no home antihypertensives  - hold home statin and ASA81 in setting of lethargy  - strict I/Os, daily weights, monitor on telemetry  - hold diuresis in setting of sepsis with hypotension in setting of h/o CAD s/p CABG x4 in 1988 with pulmonary edema on CT chest and proBNP 27,000  - satting 100% on NC 2L; c/w supplemental O2 PRN  - soft BP, no home antihypertensives  - hold home statin and ASA81 in setting of lethargy  - strict I/Os, daily weights, monitor on telemetry  - hold diuresis in setting of sepsis with hypotension  - monitor on Tele

## 2017-12-31 NOTE — H&P ADULT - PROBLEM SELECTOR PLAN 6
as patient lethargic, will give IV equivalent of home Synthroid 137 admission Cr 1.22 with baseline 0.7, now downtrending s/p fluids; likely prerenal 2/2 poor PO intake and cardiorenal  - trend BUN/Cr, avoid nephrotoxins, and renally dose medications with  in setting of h/o preDM and sepsis, downtrending s/p NS 2L  - start low dose HISS q6 as patient NPO

## 2017-12-31 NOTE — CONSULT NOTE ADULT - SUBJECTIVE AND OBJECTIVE BOX
Date of Admission:  12/31/17  CHIEF COMPLAINT:  lethargy  HISTORY OF PRESENT ILLNESS:  · HPI Objective Statement: Pt is a 89 y/o M PMHx HTN, CAD, h/o CABG, hypothyroidism, BPH brought by aid and daughter for increased lethargy and abnormal respirations x 2 days.  Pt is alert and oriented at baseline.  Pt brought in for gradual onset, worsening lethargy associated with periods of rapid breathing lasting for a few breaths.  Pt had complaints of abdominal pain three days ago.  Pt's aid notes one episode of vomiting last night, containing food debris.  Last BM overnight, normal, nonbloody, no black stools.  No fevers, chills, chest pain, dysuria, cloudy urine, flank pain, headache, head trauma.	  Cardiology consulted because of new LBBB on EKG and troponin of 0.18 in the setting of sepsis    Allergies    Bactrim DS (Flushing; Hives)  Darvon (Hives)  Demerol HCl (Hives; Rash)  Keflex (Hives; Rash)  Levaquin (Rash)  Macrobid (Rash)  sulfur (Unknown)  Zoloft (Drowsiness)    Intolerances    	    MEDICATIONS:  ambien 10mg po qd  avodart 0.5mg po qd  cardura 2mg po qd  colace 100mg po qd  lasix 40mg po qd  plavix 75mg po qd  potassium   sythroid 175  VIT d  zantac      PAST MEDICAL & SURGICAL HISTORY:  Aortic stenosis, moderate: on echo in 5/12  CAD (coronary artery disease)  Arthritis  Hard of Hearing  Lower Extremity Edema: Chronic  Colon Cancer  GERD (Gastroesophageal Reflux Disease)  BPH (Benign Prostatic Hypertrophy)  Dyslipidemia  Hypothyroidism  HTN (Hypertension)  Borderline Diabetes Mellitus  Cataract extraction status of eye: B/L eyes  S/P CABG X 4: 1988  Umbilical Hernia: repaired  History of Colon Resection: For Colon cancer 3 years back.      FAMILY HISTORY:  No pertinent family history      SOCIAL HISTORY:    [ ] Non-smoker  [ ] Smoker  [ ] Alcohol      REVIEW OF SYSTEMS:  See HPI. Otherwise, 10 point ROS done and otherwise negative.    PHYSICAL EXAM:  T(C): 37.3 (12-31-17 @ 12:13), Max: 37.3 (12-31-17 @ 12:13)  HR: 62 (12-31-17 @ 16:29) (62 - 72)  BP: 103/63 (12-31-17 @ 16:29) (96/56 - 117/72)  RR: 16 (12-31-17 @ 16:29) (16 - 23)  SpO2: 100% (12-31-17 @ 16:29) (97% - 100%)  Wt(kg): --  I&O's Summary      Appearance: Normal	  HEENT:   Normal oral mucosa, PERRL, EOMI	  Lymphatic: No lymphadenopathy  Cardiovascular: Normal S1 S2, +JVD  Respiratory: +wheezes throughout, no appreciable crackles  Psychiatry: A & O x 3, Mood & affect appropriate  Gastrointestinal:  Soft, Non-tender, + BS	  Skin: No rashes, No ecchymoses, No cyanosis	  Neurologic: Non-focal  Extremities: Normal range of motion, No clubbing, cyanosis or edema  Vascular: Peripheral pulses palpable 2+ bilaterally        LABS:	 	    CBC Full  -  ( 31 Dec 2017 12:00 )  WBC Count : 13.86 K/uL  Hemoglobin : 10.6 g/dL  Hematocrit : 33.2 %  Platelet Count - Automated : 112 K/uL  Mean Cell Volume : 97.1 fL  Mean Cell Hemoglobin : 31.0 pg  Mean Cell Hemoglobin Concentration : 31.9 %  Auto Neutrophil # : 10.51 K/uL  Auto Lymphocyte # : 1.22 K/uL  Auto Monocyte # : 1.92 K/uL  Auto Eosinophil # : 0.01 K/uL  Auto Basophil # : 0.04 K/uL  Auto Neutrophil % : 75.7 %  Auto Lymphocyte % : 8.8 %  Auto Monocyte % : 13.9 %  Auto Eosinophil % : 0.1 %  Auto Basophil % : 0.3 %    12-31    133<L>  |  95<L>  |  32<H>  ----------------------------<  472<HH>  5.9<H>   |  20<L>  |  1.22    Ca    10.1      31 Dec 2017 12:00    TPro  7.6  /  Alb  3.8  /  TBili  0.5  /  DBili  x   /  AST  27  /  ALT  16  /  AlkPhos  111  12-31

## 2017-12-31 NOTE — ED PROVIDER NOTE - PROGRESS NOTE DETAILS
DARIEN JOHN:  Received notification that troponin 0.18.  Cardiology consulted at this time. DARIEN JOHN:  CT shows mild pulmonary edema, hyperdense contents in stomach.  Last BM last night; nonbloody, not black as per HHA.  Pt accepted to Dr. Flannery.  MAR text paged at this time. DARIEN JOHN:  Received notification that troponin 0.18. likely 2/2 demand however with lbbb, Cardiology consulted at this time.

## 2017-12-31 NOTE — H&P ADULT - PROBLEM SELECTOR PLAN 7
- HSQ for DVT ppx in setting of KIRTI  - aspiration and fall precautions  - NPO for now 2/2 lethargy  - PT consult as patient lethargic, will give IV equivalent of home Synthroid 137 admission Cr 1.22 with baseline 0.7, now downtrending s/p fluids; likely prerenal 2/2 poor PO intake and cardiorenal  - trend BUN/Cr, avoid nephrotoxins, and renally dose medications

## 2017-12-31 NOTE — H&P ADULT - PROBLEM SELECTOR PLAN 2
although RVP(-), no overt evidence of bacterial infection and so likely viral etiology of sepsis  - received vanc and Zosyn in ED; c/w empiric abx for viral CAP with azithromycin and Unasyn due to Keflex allergy  - will identify with PMD on Tues regarding allergies although RVP(-), no overt evidence of bacterial infection and so likely viral etiology of sepsis  - received vanc and Zosyn in ED; c/w empiric abx for viral CAP with azithromycin and Unasyn due to Keflex allergy  - will identify with PMD on Tues regarding allergies  - monitor VS closely although RVP(-), no overt evidence of bacterial infection and so likely viral etiology of sepsis  - received vanc and Zosyn in ED; c/w empiric abx for viral CAP with azithromycin and Unasyn due to Keflex allergy  - will identify with PMD regarding allergies  - monitor VS closely

## 2017-12-31 NOTE — H&P ADULT - NSHPSOCIALHISTORY_GEN_ALL_CORE
on 11/19/17, lives at [facility]  Worked as an  and in insurance until age 89  Wheelchair bound s/p vertebral OM 4 years ago  24h HHA  No h/o toxic habits

## 2017-12-31 NOTE — H&P ADULT - NSHPPHYSICALEXAM_GEN_ALL_CORE
Vital Signs Last 24 Hrs  T(C): 36.6 (12-31-17 @ 19:41), Max: 37.3 (12-31-17 @ 12:13)  T(F): 97.8 (12-31-17 @ 19:41), Max: 99.2 (12-31-17 @ 12:13)  HR: 69 (12-31-17 @ 19:41) (62 - 72)  BP: 112/63 (12-31-17 @ 19:41) (96/56 - 117/72)  BP(mean): --  RR: 15 (12-31-17 @ 19:41) (15 - 23)  SpO2: 98% (12-31-17 @ 19:41) (97% - 100%)    PHYSICAL EXAM: Limited as patient lethargic  GENERAL: lethargic, well-developed  HEAD: Atraumatic, Normocephalic  EYES: PERRLA, conjunctiva and sclera clear  NECK: Supple, No JVD  CHEST/LUNG: Clear to auscultation bilaterally; No wheeze  HEART: Regular rate and rhythm; No murmurs, rubs, or gallops  ABDOMEN: Soft, Nontender, Nondistended; Bowel sounds present  EXTREMITIES:  2+ Peripheral Pulses, No clubbing, cyanosis, or edema  PSYCH: AAOx3  SKIN: No rashes or lesions Vital Signs Last 24 Hrs  T(C): 36.6 (12-31-17 @ 19:41), Max: 37.3 (12-31-17 @ 12:13)  T(F): 97.8 (12-31-17 @ 19:41), Max: 99.2 (12-31-17 @ 12:13)  HR: 69 (12-31-17 @ 19:41) (62 - 72)  BP: 112/63 (12-31-17 @ 19:41) (96/56 - 117/72)  BP(mean): --  RR: 15 (12-31-17 @ 19:41) (15 - 23)  SpO2: 98% (12-31-17 @ 19:41) (97% - 100%)    PHYSICAL EXAM: Limited as patient lethargic  GENERAL: lethargic, well-developed  HEAD: Atraumatic, Normocephalic  EARS: L ear deaf, R ear [place]  EYES: PERRLA, conjunctiva and sclera clear  NECK: Supple, No JVD  CHEST/LUNG: Bibasilar crackles, diffusely transmitted upper respiratory sounds  HEART: Regular rate and rhythm; No murmurs, rubs, or gallops  ABDOMEN: Soft, Nontender, Distended; Bowel sounds present  EXTREMITIES:  2+ Peripheral Pulses, No clubbing, cyanosis, or edema  PSYCH: Unable to ascertain 2/2 lethargy  SKIN: B/L pretibial abrasions Vital Signs Last 24 Hrs  T(C): 36.6 (12-31-17 @ 19:41), Max: 37.3 (12-31-17 @ 12:13)  T(F): 97.8 (12-31-17 @ 19:41), Max: 99.2 (12-31-17 @ 12:13)  HR: 69 (12-31-17 @ 19:41) (62 - 72)  BP: 112/63 (12-31-17 @ 19:41) (96/56 - 117/72)  BP(mean): --  RR: 15 (12-31-17 @ 19:41) (15 - 23)  SpO2: 98% (12-31-17 @ 19:41) (97% - 100%)    PHYSICAL EXAM: Limited as patient lethargic  GENERAL: lethargic, well-developed  HEAD: Atraumatic, Normocephalic  EARS: L ear deaf, R ear [place]  EYES: PERRLA, conjunctiva and sclera clear  NECK: Supple, No JVD  CHEST/LUNG: Bibasilar crackles, agonal breathing, mild tachypnea, diffusely transmitted upper respiratory sounds  HEART: Regular rate and rhythm; No murmurs, rubs, or gallops  ABDOMEN: Soft, Nontender, Distended; Bowel sounds present  EXTREMITIES:  2+ Peripheral Pulses, No clubbing, cyanosis, or edema  PSYCH: Unable to ascertain 2/2 lethargy  SKIN: B/L pretibial abrasions

## 2017-12-31 NOTE — H&P ADULT - PROBLEM SELECTOR PLAN 3
although RVP(-), likely viral infection as no overt evidence of bacterial infection with leukocytosis, tachypnea 23, hypotension, lactate 6.7  - monitor WBC  - monitor VS closely although RVP(-), likely viral infection as no overt evidence of bacterial infection with leukocytosis, tachypnea 23, hypotension, lactate 6.7  - monitor WBC  - monitor VS closely  - cultures pending although RVP(-), likely viral infection as no overt evidence of bacterial infection with leukocytosis, tachypnea 23, hypotension, lactate 6.7 with lactic acidosis  - monitor WBC  - monitor VS closely  - cultures pending  - trend lactate (downtrending)

## 2018-01-01 LAB
ALBUMIN SERPL ELPH-MCNC: 3.6 G/DL — SIGNIFICANT CHANGE UP (ref 3.3–5)
ALP SERPL-CCNC: 109 U/L — SIGNIFICANT CHANGE UP (ref 40–120)
ALT FLD-CCNC: 28 U/L — SIGNIFICANT CHANGE UP (ref 4–41)
AST SERPL-CCNC: 33 U/L — SIGNIFICANT CHANGE UP (ref 4–40)
BASE EXCESS BLDV CALC-SCNC: 0.1 MMOL/L — SIGNIFICANT CHANGE UP
BASOPHILS # BLD AUTO: 0.04 K/UL — SIGNIFICANT CHANGE UP (ref 0–0.2)
BASOPHILS NFR BLD AUTO: 0.4 % — SIGNIFICANT CHANGE UP (ref 0–2)
BILIRUB SERPL-MCNC: 0.3 MG/DL — SIGNIFICANT CHANGE UP (ref 0.2–1.2)
BLOOD GAS VENOUS - CREATININE: 0.88 MG/DL — SIGNIFICANT CHANGE UP (ref 0.5–1.3)
BUN SERPL-MCNC: 27 MG/DL — HIGH (ref 7–23)
CALCIUM SERPL-MCNC: 9.5 MG/DL — SIGNIFICANT CHANGE UP (ref 8.4–10.5)
CHLORIDE BLDV-SCNC: 105 MMOL/L — SIGNIFICANT CHANGE UP (ref 96–108)
CHLORIDE SERPL-SCNC: 101 MMOL/L — SIGNIFICANT CHANGE UP (ref 98–107)
CO2 SERPL-SCNC: 24 MMOL/L — SIGNIFICANT CHANGE UP (ref 22–31)
CREAT SERPL-MCNC: 0.95 MG/DL — SIGNIFICANT CHANGE UP (ref 0.5–1.3)
EOSINOPHIL # BLD AUTO: 0.08 K/UL — SIGNIFICANT CHANGE UP (ref 0–0.5)
EOSINOPHIL NFR BLD AUTO: 0.7 % — SIGNIFICANT CHANGE UP (ref 0–6)
GAS PNL BLDV: 133 MMOL/L — LOW (ref 136–146)
GLUCOSE BLDV-MCNC: 204 — HIGH (ref 70–99)
GLUCOSE SERPL-MCNC: 204 MG/DL — HIGH (ref 70–99)
HBA1C BLD-MCNC: 7.7 % — HIGH (ref 4–5.6)
HCO3 BLDV-SCNC: 25 MMOL/L — SIGNIFICANT CHANGE UP (ref 20–27)
HCT VFR BLD CALC: 29.9 % — LOW (ref 39–50)
HCT VFR BLDV CALC: 30.6 % — LOW (ref 39–51)
HGB BLD-MCNC: 9.8 G/DL — LOW (ref 13–17)
HGB BLDV-MCNC: 9.9 G/DL — LOW (ref 13–17)
IMM GRANULOCYTES # BLD AUTO: 0.17 # — SIGNIFICANT CHANGE UP
IMM GRANULOCYTES NFR BLD AUTO: 1.5 % — SIGNIFICANT CHANGE UP (ref 0–1.5)
LACTATE BLDV-MCNC: 2.7 MMOL/L — HIGH (ref 0.5–2)
LYMPHOCYTES # BLD AUTO: 1.92 K/UL — SIGNIFICANT CHANGE UP (ref 1–3.3)
LYMPHOCYTES # BLD AUTO: 17.1 % — SIGNIFICANT CHANGE UP (ref 13–44)
MAGNESIUM SERPL-MCNC: 2.3 MG/DL — SIGNIFICANT CHANGE UP (ref 1.6–2.6)
MCHC RBC-ENTMCNC: 31.2 PG — SIGNIFICANT CHANGE UP (ref 27–34)
MCHC RBC-ENTMCNC: 32.8 % — SIGNIFICANT CHANGE UP (ref 32–36)
MCV RBC AUTO: 95.2 FL — SIGNIFICANT CHANGE UP (ref 80–100)
MONOCYTES # BLD AUTO: 1.8 K/UL — HIGH (ref 0–0.9)
MONOCYTES NFR BLD AUTO: 16.1 % — HIGH (ref 2–14)
NEUTROPHILS # BLD AUTO: 7.19 K/UL — SIGNIFICANT CHANGE UP (ref 1.8–7.4)
NEUTROPHILS NFR BLD AUTO: 64.2 % — SIGNIFICANT CHANGE UP (ref 43–77)
NRBC # FLD: 0 — SIGNIFICANT CHANGE UP
PCO2 BLDV: 35 MMHG — LOW (ref 41–51)
PH BLDV: 7.44 PH — HIGH (ref 7.32–7.43)
PHOSPHATE SERPL-MCNC: 3.3 MG/DL — SIGNIFICANT CHANGE UP (ref 2.5–4.5)
PLATELET # BLD AUTO: 117 K/UL — LOW (ref 150–400)
PMV BLD: 12.3 FL — SIGNIFICANT CHANGE UP (ref 7–13)
PO2 BLDV: 89 MMHG — HIGH (ref 35–40)
POTASSIUM BLDV-SCNC: 4.3 MMOL/L — SIGNIFICANT CHANGE UP (ref 3.4–4.5)
POTASSIUM SERPL-MCNC: 4.7 MMOL/L — SIGNIFICANT CHANGE UP (ref 3.5–5.3)
POTASSIUM SERPL-SCNC: 4.7 MMOL/L — SIGNIFICANT CHANGE UP (ref 3.5–5.3)
PROT SERPL-MCNC: 6.9 G/DL — SIGNIFICANT CHANGE UP (ref 6–8.3)
RBC # BLD: 3.14 M/UL — LOW (ref 4.2–5.8)
RBC # FLD: 15.9 % — HIGH (ref 10.3–14.5)
SAO2 % BLDV: 97.1 % — HIGH (ref 60–85)
SODIUM SERPL-SCNC: 136 MMOL/L — SIGNIFICANT CHANGE UP (ref 135–145)
SPECIMEN SOURCE: SIGNIFICANT CHANGE UP
SPECIMEN SOURCE: SIGNIFICANT CHANGE UP
WBC # BLD: 11.2 K/UL — HIGH (ref 3.8–10.5)
WBC # FLD AUTO: 11.2 K/UL — HIGH (ref 3.8–10.5)

## 2018-01-01 PROCEDURE — 99223 1ST HOSP IP/OBS HIGH 75: CPT

## 2018-01-01 PROCEDURE — 99233 SBSQ HOSP IP/OBS HIGH 50: CPT

## 2018-01-01 RX ORDER — INSULIN LISPRO 100/ML
VIAL (ML) SUBCUTANEOUS
Qty: 0 | Refills: 0 | Status: DISCONTINUED | OUTPATIENT
Start: 2018-01-01 | End: 2018-01-03

## 2018-01-01 RX ORDER — FUROSEMIDE 40 MG
40 TABLET ORAL ONCE
Qty: 0 | Refills: 0 | Status: COMPLETED | OUTPATIENT
Start: 2018-01-01 | End: 2018-01-01

## 2018-01-01 RX ORDER — INSULIN LISPRO 100/ML
VIAL (ML) SUBCUTANEOUS AT BEDTIME
Qty: 0 | Refills: 0 | Status: DISCONTINUED | OUTPATIENT
Start: 2018-01-01 | End: 2018-01-03

## 2018-01-01 RX ADMIN — AMPICILLIN SODIUM AND SULBACTAM SODIUM 200 GRAM(S): 250; 125 INJECTION, POWDER, FOR SUSPENSION INTRAMUSCULAR; INTRAVENOUS at 10:23

## 2018-01-01 RX ADMIN — FAMOTIDINE 20 MILLIGRAM(S): 10 INJECTION INTRAVENOUS at 06:29

## 2018-01-01 RX ADMIN — Medication 3 MILLILITER(S): at 21:53

## 2018-01-01 RX ADMIN — Medication 87 MICROGRAM(S): at 08:20

## 2018-01-01 RX ADMIN — Medication 3 MILLILITER(S): at 16:27

## 2018-01-01 RX ADMIN — FAMOTIDINE 20 MILLIGRAM(S): 10 INJECTION INTRAVENOUS at 17:06

## 2018-01-01 RX ADMIN — Medication 2: at 13:41

## 2018-01-01 RX ADMIN — Medication 3: at 18:23

## 2018-01-01 RX ADMIN — Medication 2: at 06:29

## 2018-01-01 RX ADMIN — Medication 3 MILLILITER(S): at 11:06

## 2018-01-01 RX ADMIN — ENOXAPARIN SODIUM 40 MILLIGRAM(S): 100 INJECTION SUBCUTANEOUS at 22:45

## 2018-01-01 RX ADMIN — PANTOPRAZOLE SODIUM 40 MILLIGRAM(S): 20 TABLET, DELAYED RELEASE ORAL at 07:12

## 2018-01-01 RX ADMIN — Medication 40 MILLIGRAM(S): at 08:20

## 2018-01-01 RX ADMIN — Medication 3 MILLILITER(S): at 05:22

## 2018-01-01 RX ADMIN — AZITHROMYCIN 250 MILLIGRAM(S): 500 TABLET, FILM COATED ORAL at 17:07

## 2018-01-01 NOTE — PROGRESS NOTE ADULT - PROBLEM SELECTOR PLAN 7
admission Cr 1.22 with baseline 0.7, now downtrending s/p fluids; likely prerenal 2/2 poor PO intake and cardiorenal  - trend BUN/Cr, avoid nephrotoxins, and renally dose medications admission Cr 1.22 with baseline 0.7, now downtrending s/p fluids; likely prerenal 2/2 poor PO intake and cardiorenal  - trend BUN/Cr, avoid nephrotoxins, and renally dose medications  - Cr now 0.95, BUN/Cr ration still >20 (mildly prerenal)

## 2018-01-01 NOTE — PROGRESS NOTE ADULT - PROBLEM SELECTOR PLAN 5
new LBBB in setting of h/o CAD s/p CABG  - per Cards consult: no ACS work up as this time as troponins elevated in setting of sepsis  - troponins now downtrending  - monitor on Tele new LBBB in setting of h/o CAD s/p CABG  - per Cards consult: no ACS work up as this time as troponins elevated in setting of sepsis  - troponins now downtrending  - c/w Tele

## 2018-01-01 NOTE — PROGRESS NOTE ADULT - SUBJECTIVE AND OBJECTIVE BOX
SOFIYA Knowles MD, PGY1  Medicine Team 4  Pager: [phone]  After 7pm on weekdays or after noon on weekends: Page coverage at [phone]     NAFISA DARLING     89y     Male  Patient is a 89y old  Male who presents with a chief complaint of abnormal breathing (31 Dec 2017 19:00)       Overnight events:    REVIEW OF SYSTEMS:  General: No fever or fatigue.   CV: No chest pain or palpitations.  Pulm: No shortness of breath, wheezing, or coughing.  Abd: No abdominal pain, nausea, vomiting, diarrhea, or constipation.   Neuro: No headache, dizziness, lightheadedness, or weakness.   Skin: No rashes.     MEDICATIONS  (STANDING):  ALBUTerol/ipratropium for Nebulization 3 milliLiter(s) Nebulizer every 6 hours  ampicillin/sulbactam  IVPB      ampicillin/sulbactam  IVPB 3 Gram(s) IV Intermittent every 12 hours  azithromycin  IVPB      azithromycin  IVPB 500 milliGRAM(s) IV Intermittent every 24 hours  dextrose 5%. 1000 milliLiter(s) (50 mL/Hr) IV Continuous <Continuous>  dextrose 50% Injectable 12.5 Gram(s) IV Push once  dextrose 50% Injectable 25 Gram(s) IV Push once  dextrose 50% Injectable 25 Gram(s) IV Push once  enoxaparin Injectable 40 milliGRAM(s) SubCutaneous every 24 hours  famotidine Injectable 20 milliGRAM(s) IV Push every 12 hours  insulin lispro (HumaLOG) corrective regimen sliding scale   SubCutaneous every 6 hours  levothyroxine Injectable 87 MICROGram(s) IV Push <User Schedule>  pantoprazole  Injectable 40 milliGRAM(s) IV Push daily    MEDICATIONS  (PRN):  artificial  tears Solution 1 Drop(s) Both EYES every 6 hours PRN Dry Eyes  dextrose Gel 1 Dose(s) Oral once PRN Blood Glucose LESS THAN 70 milliGRAM(s)/deciliter  glucagon  Injectable 1 milliGRAM(s) IntraMuscular once PRN Glucose LESS THAN 70 milligrams/deciliter      VITAL SIGNS:  T(C): 36.7 (18 @ 06:00), Max: 37.3 (17 @ 12:13)  T(F): 98 (18 @ 06:00), Max: 99.2 (17 @ 12:13)  HR: 76 (18 @ 06:00) (62 - 90)  BP: 114/74 (18 @ 06:00) (89/64 - 117/72)  RR: 18 (18 @ 06:00) (15 - 23)  SpO2: 100% (18 @ 06:00) (95% - 100%)  Wt(kg): --  Daily Height in cm: 180.34 (31 Dec 2017 22:05)    Daily Weight in k (2018 06:00)      PHYSICAL EXAM:  GEN: awake, alert. No acute distress.   HEENT: NCAT, EOMI, PERRL, no lymphadenopathy, normal oropharynx.  CV: Normal S1 and S2. No murmurs, rubs, or gallops. 2+ pulses UE and LE bilaterally.   RESPI: Clear to auscultation bilaterally. No wheezes or rales. No increased work of breathing.   ABD: (+) bowel sounds. Soft, nondistended, nontender.   EXT: Full ROM, pulses 2+ bilaterally  NEURO: affect appropriate, good tone  SKIN: no rashes          CAPILLARY BLOOD GLUCOSE      POCT Blood Glucose.: 223 mg/dL (2018 06:22)  POCT Blood Glucose.: 318 mg/dL (31 Dec 2017 22:41)  POCT Blood Glucose.: 336 mg/dL (31 Dec 2017 15:03)  POCT Blood Glucose.: 462 mg/dL (31 Dec 2017 13:26)  POCT Blood Glucose.: 476 mg/dL (31 Dec 2017 11:32)    I&O's Summary    31 Dec 2017 07:01  -  2018 07:00  --------------------------------------------------------  IN: 100 mL / OUT: 0 mL / NET: 100 mL      PHYSICAL EXAM  GENERAL: NAD, well-developed  HEAD:  Atraumatic, Normocephalic  EYES: EOMI, PERRLA, conjunctiva and sclera clear  NECK: Supple, No JVD  CHEST/LUNG: Clear to auscultation bilaterally; No wheeze  HEART: Regular rate and rhythm; No murmurs, rubs, or gallops  ABDOMEN: Soft, Nontender, Nondistended; Bowel sounds present  EXTREMITIES:  2+ Peripheral Pulses, No clubbing, cyanosis, or edema  PSYCH: AAOx3  SKIN: No rashes or lesions    LABS:                        9.8    11.20 )-----------( 117      ( 2018 06:20 )             29.9         135  |  99  |  30<H>  ----------------------------<  299<H>  4.9   |  22  |  1.03    Ca    9.4      31 Dec 2017 16:15    TPro  7.6  /  Alb  3.8  /  TBili  0.5  /  DBili  x   /  AST  27  /  ALT  16  /  AlkPhos  111      PT/INR - ( 31 Dec 2017 12:00 )   PT: 13.2 SEC;   INR: 1.19          PTT - ( 31 Dec 2017 12:00 )  PTT:28.3 SEC  CARDIAC MARKERS ( 31 Dec 2017 16:15 )  x     / 0.16 ng/mL / x     / x     / x      CARDIAC MARKERS ( 31 Dec 2017 12:00 )  x     / 0.18 ng/mL / 84 u/L / 4.65 ng/mL / x          Urinalysis Basic - ( 31 Dec 2017 16:15 )    Color: YELLOW / Appearance: HAZY / S.028 / pH: 6.0  Gluc: >1000 / Ketone: NEGATIVE  / Bili: NEGATIVE / Urobili: NORMAL mg/dL   Blood: NEGATIVE / Protein: 30 mg/dL / Nitrite: NEGATIVE   Leuk Esterase: SMALL / RBC: 2-5 / WBC 2-5   Sq Epi: OCC / Non Sq Epi: x / Bacteria: x      RADIOLOGY & ADDITIONAL TESTS:    Imaging Personally Reviewed:  Consultant(s) Notes Reviewed:    Care Discussed with Consultants/Other Providers: SOFIYA Knowles MD, PGY1  Medicine Team 4  Pager: [phone]  After 7pm on weekdays or after noon on weekends: Page coverage at [phone]     NAFISA DARLING     89y     Male  Patient is a 89y old  Male who presents with a chief complaint of abnormal breathing (31 Dec 2017 19:00)       Overnight events:  No acute events overnight. Pt w/ some improved BP (up from low of 89/64). Pt more alert this AM, answers to voice and opening eyes more spontaneously. C/o difficulty sleeping, states he is hungry.     REVIEW OF SYSTEMS:  General: No fever or fatigue.   CV: No chest pain or palpitations.  Pulm: + shortness of breath, + excessive mucus production, +coughing, no wheezing  Abd: No abdominal pain, nausea, vomiting, diarrhea, or constipation.   Neuro: No headache, dizziness, lightheadedness, or weakness.   Skin: No rashes.     MEDICATIONS  (STANDING):  ALBUTerol/ipratropium for Nebulization 3 milliLiter(s) Nebulizer every 6 hours  ampicillin/sulbactam  IVPB      ampicillin/sulbactam  IVPB 3 Gram(s) IV Intermittent every 12 hours  azithromycin  IVPB      azithromycin  IVPB 500 milliGRAM(s) IV Intermittent every 24 hours  dextrose 5%. 1000 milliLiter(s) (50 mL/Hr) IV Continuous <Continuous>  dextrose 50% Injectable 12.5 Gram(s) IV Push once  dextrose 50% Injectable 25 Gram(s) IV Push once  dextrose 50% Injectable 25 Gram(s) IV Push once  enoxaparin Injectable 40 milliGRAM(s) SubCutaneous every 24 hours  famotidine Injectable 20 milliGRAM(s) IV Push every 12 hours  insulin lispro (HumaLOG) corrective regimen sliding scale   SubCutaneous every 6 hours  levothyroxine Injectable 87 MICROGram(s) IV Push <User Schedule>  pantoprazole  Injectable 40 milliGRAM(s) IV Push daily    MEDICATIONS  (PRN):  artificial  tears Solution 1 Drop(s) Both EYES every 6 hours PRN Dry Eyes  dextrose Gel 1 Dose(s) Oral once PRN Blood Glucose LESS THAN 70 milliGRAM(s)/deciliter  glucagon  Injectable 1 milliGRAM(s) IntraMuscular once PRN Glucose LESS THAN 70 milligrams/deciliter      VITAL SIGNS:  T(C): 36.7 (18 @ 06:00), Max: 37.3 (17 @ 12:13)  T(F): 98 (18 @ 06:00), Max: 99.2 (17 @ 12:13)  HR: 76 (18 @ 06:00) (62 - 90)  BP: 114/74 (18 @ 06:00) (89/64 - 117/72)  RR: 18 (18 @ 06:00) (15 - 23)  SpO2: 100% (18 @ 06:00) (95% - 100%)  Daily Height in cm: 180.34 (31 Dec 2017 22:05)    Daily Weight in k (2018 06:00)        PHYSICAL EXAM:   GENERAL: sleepy but arousable, well-developed, appears well-nourished   HEAD: Atraumatic, Normocephalic  EARS: L ear deaf, R ear [place]  EYES: PERRLA, conjunctiva and sclera clear  NECK: Supple, No JVD  CHEST/LUNG: Bibasilar crackles, agonal breathing, normal breathing rate, diffusely transmitted upper respiratory sounds  HEART: Regular rate and rhythm; No murmurs, rubs, or gallops  ABDOMEN: Soft, Nontender, Distended; Bowel sounds present  EXTREMITIES:  2+ Peripheral Pulses, No clubbing, cyanosis, mild LE edema  PSYCH: affect is anxious/ upset  SKIN: B/L pretibial abrasions    CAPILLARY BLOOD GLUCOSE    POCT Blood Glucose.: 223 mg/dL (2018 06:22)  POCT Blood Glucose.: 318 mg/dL (31 Dec 2017 22:41)  POCT Blood Glucose.: 336 mg/dL (31 Dec 2017 15:03)  POCT Blood Glucose.: 462 mg/dL (31 Dec 2017 13:26)  POCT Blood Glucose.: 476 mg/dL (31 Dec 2017 11:32)    I&O's Summary    31 Dec 2017 07:01  -  2018 07:00  --------------------------------------------------------  IN: 100 mL / OUT: 0 mL / NET: 100 mL        LABS:                        9.8    11.20 )-----------( 117      ( 2018 06:20 )             29.9         135  |  99  |  30<H>  ----------------------------<  299<H>  4.9   |  22  |  1.03    Ca    9.4      31 Dec 2017 16:15    TPro  7.6  /  Alb  3.8  /  TBili  0.5  /  DBili  x   /  AST  27  /  ALT  16  /  AlkPhos  111      PT/INR - ( 31 Dec 2017 12:00 )   PT: 13.2 SEC;   INR: 1.19          PTT - ( 31 Dec 2017 12:00 )  PTT:28.3 SEC  CARDIAC MARKERS ( 31 Dec 2017 16:15 )  x     / 0.16 ng/mL / x     / x     / x      CARDIAC MARKERS ( 31 Dec 2017 12:00 )  x     / 0.18 ng/mL / 84 u/L / 4.65 ng/mL / x          Urinalysis Basic - ( 31 Dec 2017 16:15 )    Color: YELLOW / Appearance: HAZY / S.028 / pH: 6.0  Gluc: >1000 / Ketone: NEGATIVE  / Bili: NEGATIVE / Urobili: NORMAL mg/dL   Blood: NEGATIVE / Protein: 30 mg/dL / Nitrite: NEGATIVE   Leuk Esterase: SMALL / RBC: 2-5 / WBC 2-5   Sq Epi: OCC / Non Sq Epi: x / Bacteria: x      RADIOLOGY & ADDITIONAL TESTS:    Imaging Personally Reviewed:  Consultant(s) Notes Reviewed:    Care Discussed with Consultants/Other Providers:

## 2018-01-01 NOTE — PROGRESS NOTE ADULT - PROBLEM SELECTOR PLAN 2
although RVP(-), no overt evidence of bacterial infection and so likely viral etiology of sepsis  - received vanc and Zosyn in ED; c/w empiric abx for viral CAP with azithromycin and Unasyn due to Keflex allergy  - will identify with PMD regarding allergies  - monitor VS closely although RVP(-), no overt evidence of bacterial infection and so likely viral etiology of sepsis  - received vanc and Zosyn in ED; c/w empiric abx for viral CAP with azithromycin and Unasyn (pt w/ hx Keflex allergy)  - will f/u with PMD regarding allergies  - monitor VS closely (Q4 hr at this time)

## 2018-01-01 NOTE — PROGRESS NOTE ADULT - PROBLEM SELECTOR PLAN 8
as patient lethargic, will give IV equivalent of home Synthroid 137 as patient lethargic, will give IV equivalent of home Synthroid 137 Q daily

## 2018-01-01 NOTE — PROGRESS NOTE ADULT - PROBLEM SELECTOR PLAN 1
in setting of h/o CAD s/p CABG x4 in 1988 with pulmonary edema on CT chest and proBNP 27,000  - satting 100% on NC 2L; c/w supplemental O2 PRN  - soft BP, no home antihypertensives  - hold home statin and ASA81 in setting of lethargy  - strict I/Os, daily weights, monitor on telemetry  - hold diuresis in setting of sepsis with hypotension  - monitor on Tele in setting of h/o CAD s/p CABG x4 in 1988 with pulmonary edema on CT chest and proBNP 27,000  - satting % on NC 2L; c/w supplemental O2 PRN  - soft BP, holding home antihypertensives  - hold home statin and ASA81 in setting of lethargy  - strict I/Os (may need condom cath) daily weights, monitor on telemetry  - diuresing carefully PRN given fluid overload appearance (s/p 40 mg IV lasix this AM given bibasilar crackles)  - continue to monitor on Tele; new LBBB

## 2018-01-01 NOTE — PROGRESS NOTE ADULT - PROBLEM SELECTOR PLAN 3
although RVP(-), likely viral infection as no overt evidence of bacterial infection with leukocytosis, tachypnea 23, hypotension, lactate 6.7 with lactic acidosis  - monitor WBC  - monitor VS closely  - cultures pending  - trend lactate (downtrending) although RVP(-), likely viral infection as no overt evidence of bacterial infection with leukocytosis, tachypnea 23, hypotension ON , lactate 6.7 with lactic acidosis on admission  - improvement in RR to 16-20  - monitor WBC  - monitor VS closely  - cultures pending  - trend lactate (downtrending), from VB.7 (12.31 @ 12:00p)--> 5.7 (12.31 @ 2p) --> 5.2 (12.31 @ 4:30p)--> 2.7 (01.01 @ 6a)  - holding IVF given vol overload/ CHF

## 2018-01-01 NOTE — PROGRESS NOTE ADULT - PROBLEM SELECTOR PLAN 9
- HSQ for DVT ppx in setting of KIRTI  - aspiration and fall precautions  - NPO for now 2/2 lethargy  - PT consult

## 2018-01-01 NOTE — PROGRESS NOTE ADULT - PROBLEM SELECTOR PLAN 10
Daughter Theresa Esteevz is HCP and POA with patient being DNR/DNI with paperwork in the chart.  IVF, antibiotics, and other medical management acceptable.

## 2018-01-01 NOTE — PROGRESS NOTE ADULT - PROBLEM SELECTOR PLAN 6
with  in setting of h/o preDM and sepsis, downtrending s/p NS 2L  - start low dose HISS q6 as patient NPO Elevated BG to 472 on admission in setting of h/o DM and sepsis, downtrending s/p NS 2L, low SS  - c/w corrective low dose SS  - HbA1C= 7.7

## 2018-01-01 NOTE — PROGRESS NOTE ADULT - ASSESSMENT
89M with CAD s/p CABG x4 (1988) c/b HFrEF (5/2013 TTE EF 40-45%, mild MR, mod AS, mod LA, reversal of E-A, mod TR), HTN, HLD, hypothyroid, colon CA s/p resection, internal hemorrhoids who was brought in by daughter for labored breathing found to be in sepsis likely 2/2 viral infection despite RVP(-) c/b acute on chronic HFrEF exacerbation. 89M with CAD s/p CABG x4 (1988) c/b HFrEF (5/2013 TTE EF 40-45%, mild MR, mod AS, mod LA, reversal of E-A, mod TR), HTN, HLD, hypothyroid, colon CA s/p resection, internal hemorrhoids who was BIB daughter for labored breathing, found to be in sepsis likely 2/2 viral infection despite RVP(-) c/b acute on chronic HFrEF exacerbation.

## 2018-01-01 NOTE — PROGRESS NOTE ADULT - PROBLEM SELECTOR PLAN 4
- 2/2 infection, hyperglycemia, KIRTI  - treat underlying causes, monitor mental status    # functional quadriplegia  due to current medical condition, baseline wheelchair bound - improvement in mental status from last night, arousable to voice, conversant, oriented   - change in baseline MS likely 2/2 infection, hyperglycemia, KIRTI  - treat underlying causes, monitor mental status    # functional quadriplegia  due to current medical condition, baseline wheelchair bound

## 2018-01-02 LAB
BACTERIA UR CULT: SIGNIFICANT CHANGE UP
BASOPHILS # BLD AUTO: 0.05 K/UL — SIGNIFICANT CHANGE UP (ref 0–0.2)
BASOPHILS NFR BLD AUTO: 0.5 % — SIGNIFICANT CHANGE UP (ref 0–2)
BUN SERPL-MCNC: 28 MG/DL — HIGH (ref 7–23)
CALCIUM SERPL-MCNC: 9.4 MG/DL — SIGNIFICANT CHANGE UP (ref 8.4–10.5)
CHLORIDE SERPL-SCNC: 100 MMOL/L — SIGNIFICANT CHANGE UP (ref 98–107)
CO2 SERPL-SCNC: 22 MMOL/L — SIGNIFICANT CHANGE UP (ref 22–31)
CREAT SERPL-MCNC: 1.08 MG/DL — SIGNIFICANT CHANGE UP (ref 0.5–1.3)
EOSINOPHIL # BLD AUTO: 0.07 K/UL — SIGNIFICANT CHANGE UP (ref 0–0.5)
EOSINOPHIL NFR BLD AUTO: 0.6 % — SIGNIFICANT CHANGE UP (ref 0–6)
GLUCOSE SERPL-MCNC: 168 MG/DL — HIGH (ref 70–99)
HCT VFR BLD CALC: 31.1 % — LOW (ref 39–50)
HGB BLD-MCNC: 9.8 G/DL — LOW (ref 13–17)
IMM GRANULOCYTES # BLD AUTO: 0.21 # — SIGNIFICANT CHANGE UP
IMM GRANULOCYTES NFR BLD AUTO: 1.9 % — HIGH (ref 0–1.5)
LYMPHOCYTES # BLD AUTO: 1.71 K/UL — SIGNIFICANT CHANGE UP (ref 1–3.3)
LYMPHOCYTES # BLD AUTO: 15.9 % — SIGNIFICANT CHANGE UP (ref 13–44)
MAGNESIUM SERPL-MCNC: 2.3 MG/DL — SIGNIFICANT CHANGE UP (ref 1.6–2.6)
MCHC RBC-ENTMCNC: 30.4 PG — SIGNIFICANT CHANGE UP (ref 27–34)
MCHC RBC-ENTMCNC: 31.5 % — LOW (ref 32–36)
MCV RBC AUTO: 96.6 FL — SIGNIFICANT CHANGE UP (ref 80–100)
MONOCYTES # BLD AUTO: 1.65 K/UL — HIGH (ref 0–0.9)
MONOCYTES NFR BLD AUTO: 15.3 % — HIGH (ref 2–14)
NEUTROPHILS # BLD AUTO: 7.08 K/UL — SIGNIFICANT CHANGE UP (ref 1.8–7.4)
NEUTROPHILS NFR BLD AUTO: 65.8 % — SIGNIFICANT CHANGE UP (ref 43–77)
NRBC # FLD: 0 — SIGNIFICANT CHANGE UP
PHOSPHATE SERPL-MCNC: 3 MG/DL — SIGNIFICANT CHANGE UP (ref 2.5–4.5)
PLATELET # BLD AUTO: 132 K/UL — LOW (ref 150–400)
PMV BLD: 12.9 FL — SIGNIFICANT CHANGE UP (ref 7–13)
POTASSIUM SERPL-MCNC: 4.2 MMOL/L — SIGNIFICANT CHANGE UP (ref 3.5–5.3)
POTASSIUM SERPL-SCNC: 4.2 MMOL/L — SIGNIFICANT CHANGE UP (ref 3.5–5.3)
RBC # BLD: 3.22 M/UL — LOW (ref 4.2–5.8)
RBC # FLD: 15.7 % — HIGH (ref 10.3–14.5)
SODIUM SERPL-SCNC: 139 MMOL/L — SIGNIFICANT CHANGE UP (ref 135–145)
SPECIMEN SOURCE: SIGNIFICANT CHANGE UP
WBC # BLD: 10.77 K/UL — HIGH (ref 3.8–10.5)
WBC # FLD AUTO: 10.77 K/UL — HIGH (ref 3.8–10.5)

## 2018-01-02 PROCEDURE — 99233 SBSQ HOSP IP/OBS HIGH 50: CPT | Mod: GC

## 2018-01-02 RX ORDER — LEVOTHYROXINE SODIUM 125 MCG
175 TABLET ORAL DAILY
Qty: 0 | Refills: 0 | Status: DISCONTINUED | OUTPATIENT
Start: 2018-01-02 | End: 2018-01-03

## 2018-01-02 RX ORDER — PANTOPRAZOLE SODIUM 20 MG/1
40 TABLET, DELAYED RELEASE ORAL
Qty: 0 | Refills: 0 | Status: DISCONTINUED | OUTPATIENT
Start: 2018-01-02 | End: 2018-01-03

## 2018-01-02 RX ORDER — ASPIRIN/CALCIUM CARB/MAGNESIUM 324 MG
81 TABLET ORAL DAILY
Qty: 0 | Refills: 0 | Status: DISCONTINUED | OUTPATIENT
Start: 2018-01-02 | End: 2018-01-03

## 2018-01-02 RX ORDER — CEFTRIAXONE 500 MG/1
1 INJECTION, POWDER, FOR SOLUTION INTRAMUSCULAR; INTRAVENOUS EVERY 24 HOURS
Qty: 0 | Refills: 0 | Status: DISCONTINUED | OUTPATIENT
Start: 2018-01-02 | End: 2018-01-03

## 2018-01-02 RX ORDER — FAMOTIDINE 10 MG/ML
20 INJECTION INTRAVENOUS
Qty: 0 | Refills: 0 | Status: DISCONTINUED | OUTPATIENT
Start: 2018-01-02 | End: 2018-01-03

## 2018-01-02 RX ORDER — INSULIN GLARGINE 100 [IU]/ML
3 INJECTION, SOLUTION SUBCUTANEOUS AT BEDTIME
Qty: 0 | Refills: 0 | Status: DISCONTINUED | OUTPATIENT
Start: 2018-01-02 | End: 2018-01-02

## 2018-01-02 RX ADMIN — ENOXAPARIN SODIUM 40 MILLIGRAM(S): 100 INJECTION SUBCUTANEOUS at 21:09

## 2018-01-02 RX ADMIN — PANTOPRAZOLE SODIUM 40 MILLIGRAM(S): 20 TABLET, DELAYED RELEASE ORAL at 06:22

## 2018-01-02 RX ADMIN — Medication 3: at 18:11

## 2018-01-02 RX ADMIN — AZITHROMYCIN 250 MILLIGRAM(S): 500 TABLET, FILM COATED ORAL at 18:11

## 2018-01-02 RX ADMIN — Medication: at 21:51

## 2018-01-02 RX ADMIN — Medication 3 MILLILITER(S): at 15:50

## 2018-01-02 RX ADMIN — Medication 3: at 12:57

## 2018-01-02 RX ADMIN — Medication 1: at 09:15

## 2018-01-02 RX ADMIN — FAMOTIDINE 20 MILLIGRAM(S): 10 INJECTION INTRAVENOUS at 06:22

## 2018-01-02 RX ADMIN — Medication 3 MILLILITER(S): at 03:06

## 2018-01-02 RX ADMIN — Medication 3 MILLILITER(S): at 22:49

## 2018-01-02 RX ADMIN — AMPICILLIN SODIUM AND SULBACTAM SODIUM 200 GRAM(S): 250; 125 INJECTION, POWDER, FOR SUSPENSION INTRAMUSCULAR; INTRAVENOUS at 00:14

## 2018-01-02 RX ADMIN — Medication 87 MICROGRAM(S): at 10:21

## 2018-01-02 RX ADMIN — CEFTRIAXONE 100 GRAM(S): 500 INJECTION, POWDER, FOR SOLUTION INTRAMUSCULAR; INTRAVENOUS at 21:10

## 2018-01-02 RX ADMIN — AMPICILLIN SODIUM AND SULBACTAM SODIUM 200 GRAM(S): 250; 125 INJECTION, POWDER, FOR SUSPENSION INTRAMUSCULAR; INTRAVENOUS at 11:43

## 2018-01-02 RX ADMIN — Medication 3 MILLILITER(S): at 10:06

## 2018-01-02 NOTE — PROGRESS NOTE ADULT - SUBJECTIVE AND OBJECTIVE BOX
Interval events:  No acute events overnight. Troponin downtrended.    Tele: sinus with 1st degree avb, PVCs and bigeminy    Medications:  ALBUTerol/ipratropium for Nebulization 3 milliLiter(s) Nebulizer every 6 hours  ampicillin/sulbactam  IVPB      ampicillin/sulbactam  IVPB 3 Gram(s) IV Intermittent every 12 hours  artificial  tears Solution 1 Drop(s) Both EYES every 6 hours PRN  azithromycin  IVPB      azithromycin  IVPB 500 milliGRAM(s) IV Intermittent every 24 hours  dextrose 5%. 1000 milliLiter(s) IV Continuous <Continuous>  dextrose 50% Injectable 12.5 Gram(s) IV Push once  dextrose 50% Injectable 25 Gram(s) IV Push once  dextrose 50% Injectable 25 Gram(s) IV Push once  dextrose Gel 1 Dose(s) Oral once PRN  enoxaparin Injectable 40 milliGRAM(s) SubCutaneous every 24 hours  famotidine Injectable 20 milliGRAM(s) IV Push every 12 hours  glucagon  Injectable 1 milliGRAM(s) IntraMuscular once PRN  insulin lispro (HumaLOG) corrective regimen sliding scale   SubCutaneous three times a day before meals  insulin lispro (HumaLOG) corrective regimen sliding scale   SubCutaneous at bedtime  levothyroxine Injectable 87 MICROGram(s) IV Push <User Schedule>  pantoprazole  Injectable 40 milliGRAM(s) IV Push daily    Vitals:  T(C): 36.3 (18 @ 06:20), Max: 37.1 (18 @ 13:51)  HR: 81 (18 @ 06:20) (72 - 84)  BP: 94/67 (18 @ 06:20) (90/60 - 94/67)  BP(mean): --  RR: 20 (18 @ 06:20) (18 - 20)  SpO2: 100% (18 @ 06:20) (97% - 100%)  Wt(kg): --  Daily     Daily Weight in k.4 (2018 06:20)  I&O's Summary    2018 07:01  -  2018 07:00  --------------------------------------------------------  IN: 100 mL / OUT: 1 mL / NET: 99 mL    2018 07:01  -  2018 10:09  --------------------------------------------------------  IN: 240 mL / OUT: 0 mL / NET: 240 mL        Physical Exam:  Appearance:  Normal, NAD  Eyes: PERRL, EOMI  HENT: Normal oral [place] NC/AT, large suprasternal area of depression  Cardiovascular: S1, S2, RRR, No m/r/g appreciated, No edema, no elevation in JVP  Respiratory: Clear to auscultation bilaterally  Gastrointestinal: Soft, Non-tender, Non-distended, BS+  Musculoskeletal:  No clubbing, No joint deformity   Neurologic: Non-focal  Lymphatic: No lymphadenopathy  Psychiatry: AAOx3, Mood & affect appropriate  Skin: No rashes, No ecchymoses, No cyanosis    Labs:                        9.8    10.77 )-----------( 132      ( 2018 07:25 )             31.1     01-02    139  |  100  |  28<H>  ----------------------------<  168<H>  4.2   |  22  |  1.08    Ca    9.4      2018 07:00  Phos  3.0     -  Mg     2.3     -02    TPro  6.9  /  Alb  3.6  /  TBili  0.3  /  DBili  x   /  AST  33  /  ALT  28  /  AlkPhos  109  01-01    PT/INR - ( 31 Dec 2017 12:00 )   PT: 13.2 SEC;   INR: 1.19          PTT - ( 31 Dec 2017 12:00 )  PTT:28.3 SEC  CARDIAC MARKERS ( 31 Dec 2017 16:15 )  x     / 0.16 ng/mL / x     / x     / x      CARDIAC MARKERS ( 31 Dec 2017 12:00 )  x     / 0.18 ng/mL / 84 u/L / 4.65 ng/mL / x          Serum Pro-Brain Natriuretic Peptide: 01270 pg/mL ( @ 12:00)    New results/imaging:  none Interval events:  No acute events overnight. Troponin downtrended.    Tele: sinus with 1st degree avb, PVCs and bigeminy    Medications:  ALBUTerol/ipratropium for Nebulization 3 milliLiter(s) Nebulizer every 6 hours  ampicillin/sulbactam  IVPB      ampicillin/sulbactam  IVPB 3 Gram(s) IV Intermittent every 12 hours  artificial  tears Solution 1 Drop(s) Both EYES every 6 hours PRN  azithromycin  IVPB      azithromycin  IVPB 500 milliGRAM(s) IV Intermittent every 24 hours  dextrose 5%. 1000 milliLiter(s) IV Continuous <Continuous>  dextrose 50% Injectable 12.5 Gram(s) IV Push once  dextrose 50% Injectable 25 Gram(s) IV Push once  dextrose 50% Injectable 25 Gram(s) IV Push once  dextrose Gel 1 Dose(s) Oral once PRN  enoxaparin Injectable 40 milliGRAM(s) SubCutaneous every 24 hours  famotidine Injectable 20 milliGRAM(s) IV Push every 12 hours  glucagon  Injectable 1 milliGRAM(s) IntraMuscular once PRN  insulin lispro (HumaLOG) corrective regimen sliding scale   SubCutaneous three times a day before meals  insulin lispro (HumaLOG) corrective regimen sliding scale   SubCutaneous at bedtime  levothyroxine Injectable 87 MICROGram(s) IV Push <User Schedule>  pantoprazole  Injectable 40 milliGRAM(s) IV Push daily    Vitals:  T(C): 36.3 (18 @ 06:20), Max: 37.1 (18 @ 13:51)  HR: 81 (18 @ 06:20) (72 - 84)  BP: 94/67 (18 @ 06:20) (90/60 - 94/67)  BP(mean): --  RR: 20 (18 @ 06:20) (18 - 20)  SpO2: 100% (18 @ 06:20) (97% - 100%)  Wt(kg): --  Daily     Daily Weight in k.4 (2018 06:20)  I&O's Summary    2018 07:01  -  2018 07:00  --------------------------------------------------------  IN: 100 mL / OUT: 1 mL / NET: 99 mL    2018 07:01  -  2018 10:09  --------------------------------------------------------  IN: 240 mL / OUT: 0 mL / NET: 240 mL        Physical Exam:  Appearance:  Normal, NAD  Eyes: PERRL, EOMI  HENT: Normal oral [place] NC/AT,  Cardiovascular: S1, S2, RRR, No m/r/g appreciated, No edema, no elevation in JVP  Respiratory: Clear to auscultation bilaterally  Gastrointestinal: Soft, Non-tender, Non-distended, BS+  Musculoskeletal:  No clubbing, No joint deformity   Neurologic: Non-focal  Lymphatic: No lymphadenopathy  Psychiatry: AAOx1  Skin: No rashes, No ecchymoses, No cyanosis    Labs:                        9.8    10.77 )-----------( 132      ( 2018 07:25 )             31.1     01-02    139  |  100  |  28<H>  ----------------------------<  168<H>  4.2   |  22  |  1.08    Ca    9.4      2018 07:00  Phos  3.0     -02  Mg     2.3     -02    TPro  6.9  /  Alb  3.6  /  TBili  0.3  /  DBili  x   /  AST  33  /  ALT  28  /  AlkPhos  109  01-01    PT/INR - ( 31 Dec 2017 12:00 )   PT: 13.2 SEC;   INR: 1.19          PTT - ( 31 Dec 2017 12:00 )  PTT:28.3 SEC  CARDIAC MARKERS ( 31 Dec 2017 16:15 )  x     / 0.16 ng/mL / x     / x     / x      CARDIAC MARKERS ( 31 Dec 2017 12:00 )  x     / 0.18 ng/mL / 84 u/L / 4.65 ng/mL / x          Serum Pro-Brain Natriuretic Peptide: 78321 pg/mL ( @ 12:00)    New results/imaging:  none

## 2018-01-02 NOTE — PROGRESS NOTE ADULT - PROBLEM SELECTOR PLAN 1
in setting of h/o CAD s/p CABG x4 in 1988 with pulmonary edema on CT chest and proBNP 27,000  - satting % on NC 2L; c/w supplemental O2 PRN  - soft BP, holding home antihypertensives  - hold home statin and ASA81 in setting of lethargy  - strict I/Os (may need condom cath) daily weights, monitor on telemetry  - diuresing carefully PRN given fluid overload appearance (s/p 40 mg IV lasix this AM given bibasilar crackles)  - continue to monitor on Tele; new LBBB in setting of h/o CAD s/p CABG x4 in 1988 with pulmonary edema on CT chest and proBNP 27,000  - satting % on NC 2L; c/w supplemental O2 PRN  - soft BP, holding home antihypertensives  - hold home statin and ASA81 in setting of lethargy  - strict I/Os (may need condom cath) daily weights, monitor on telemetry  - diuresing carefully PRN given fluid overload appearance   - continue to monitor on Tele; new LBBB  - appreciate cards recs in setting of h/o CAD s/p CABG x4 in 1988 with pulmonary edema on CT chest and proBNP 27,000  - satting % on NC 2L; c/w supplemental O2 PRN  - soft BP, holding home antihypertensives  - re-adding home meds statin + aspirin given now alert enough for PO meds  - strict I/Os (may need condom cath) daily weights, monitor on telemetry  - diuresing carefully PRN given fluid overload appearance   - continue to monitor on Tele; new LBBB  - appreciate cards recs in setting of h/o CAD s/p CABG x4 in 1988 with pulmonary edema on CT chest and proBNP 27,000  - satting % on NC 2L; c/w supplemental O2 PRN. Check O2 sat on RA  - soft BP, holding home antihypertensives  - strict I/Os (may need condom cath) daily weights  - diuresing carefully PRN given low BP  - appreciate cards recs

## 2018-01-02 NOTE — PROGRESS NOTE ADULT - PROBLEM SELECTOR PLAN 5
new LBBB in setting of h/o CAD s/p CABG  - per Cards consult: no ACS work up as this time as troponins elevated in setting of sepsis  - troponins now downtrending  - c/w Tele new LBBB in setting of h/o CAD s/p CABG  - per Cards consult: no ACS work up as this time as troponins elevated in setting of sepsis (demand ischemia)  - troponins now downtrending  - c/w Tele new LBBB in setting of h/o CAD s/p CABG  - per Cards consult: no ACS work up as this time as troponins elevated in setting of sepsis (demand ischemia)  - troponins now downtrending

## 2018-01-02 NOTE — PROGRESS NOTE ADULT - PROBLEM SELECTOR PLAN 3
although RVP(-), likely viral infection as no overt evidence of bacterial infection with leukocytosis, tachypnea 23, hypotension ON , lactate 6.7 with lactic acidosis on admission  - improvement in RR to 16-20  - monitor WBC  - monitor VS closely  - cultures pending  - trend lactate (downtrending), from VB.7 (12.31 @ 12:00p)--> 5.7 (12.31 @ 2p) --> 5.2 (12.31 @ 4:30p)--> 2.7 (01.01 @ 6a)  - holding IVF given vol overload/ CHF although RVP(-), likely viral infection as no overt evidence of bacterial infection with leukocytosis, tachypnea 23, hypotension ON , lactate 6.7 with lactic acidosis on admission  - improvement in RR to 16-20  - monitor WBC  - cultures negative @ 48 hr   - trend lactate (downtrending), from VB.7 (12.31 @ 12:00p)--> 5.7 (12.31 @ 2p) --> 5.2 (12.31 @ 4:30p)--> 2.7 (01.01 @ 6a)  - holding IVF given vol overload/ CHF

## 2018-01-02 NOTE — PROGRESS NOTE ADULT - PROBLEM SELECTOR PLAN 2
although RVP(-), no overt evidence of bacterial infection and so likely viral etiology of sepsis  - received vanc and Zosyn in ED; c/w empiric abx for viral CAP with azithromycin and Unasyn (pt w/ hx Keflex allergy)  - will f/u with PMD regarding allergies  - monitor VS closely (Q4 hr at this time) - Although RVP(-), no overt evidence of bacterial infection and so likely viral etiology of sepsis  - received vanc and zosyn in ED; c/w empiric abx for viral CAP with azithromycin and Unasyn (pt w/ hx Keflex allergy, will f/u w/ PCP today)  - monitor VS per routine given pt more clinically stable  - currently still requiring O2 via nasal cannula, titrating down - Although RVP(-), no overt evidence of bacterial infection and so likely viral etiology of sepsis  - received vanc and zosyn in ED; c/w empiric abx for viral CAP w/ azithromycin and ceftriaxone (called PMD who clarified that has given keflex in past w/ no issues)  - monitor VS per routine given pt more clinically stable  - currently still requiring O2 via nasal cannula, titrating down

## 2018-01-02 NOTE — PROGRESS NOTE ADULT - PROBLEM SELECTOR PLAN 9
- HSQ for DVT ppx in setting of KIRTI  - aspiration and fall precautions  - NPO for now 2/2 lethargy  - PT consult - HSQ for DVT ppx in setting of KIRTI  - aspiration and fall precautions  - upgraded diet to dysphagia one as pt passed dysphagia screen but w/ waxing and waning mental status   - PT: no home care needs - HSQ for DVT ppx in setting of KIRTI  - aspiration and fall precautions  - upgraded diet to dysphagia 1 as pt passed dysphagia screen  - adding back PO meds   - PT: no home care needs - Lovenox for DVT ppx  - aspiration and fall precautions: Dysphagia 1 diet

## 2018-01-02 NOTE — PROGRESS NOTE ADULT - ASSESSMENT
89M with CABG, AS, CAD, HTN, DM, colon cancer with new LBBB and mild troponin elevation. Likely Type II NSTEMI.    -- defer AC given anticoagulation    Robert Torres MD

## 2018-01-02 NOTE — PROGRESS NOTE ADULT - PROBLEM SELECTOR PLAN 8
as patient lethargic, will give IV equivalent of home Synthroid 137 Q daily PO synthroid 175 mcg as pt w/ improved mental status PO synthroid 175 mcg

## 2018-01-02 NOTE — PROGRESS NOTE ADULT - PROBLEM SELECTOR PLAN 4
- improvement in mental status from last night, arousable to voice, conversant, oriented   - change in baseline MS likely 2/2 infection, hyperglycemia, KIRTI  - treat underlying causes, monitor mental status    # functional quadriplegia  due to current medical condition, baseline wheelchair bound - improvement in mental status from admission, alert, oriented, conversant  - change in baseline MS likely 2/2 infection, hyperglycemia, KIRTI  - treat underlying causes, monitor mental status    # functional quadriplegia  due to current medical condition, baseline wheelchair bound

## 2018-01-02 NOTE — PROGRESS NOTE ADULT - PROBLEM SELECTOR PLAN 10
Daughter Theresa Estevez is HCP and POA with patient being DNR/DNI with paperwork in the chart.  IVF, antibiotics, and other medical management acceptable. Daughter Theresa Estevez is HCP and POA with patient being DNR/DNI with paperwork in the chart. IVF, antibiotics, and other medical management acceptable.

## 2018-01-02 NOTE — PROGRESS NOTE ADULT - PROBLEM SELECTOR PLAN 7
admission Cr 1.22 with baseline 0.7, now downtrending s/p fluids; likely prerenal 2/2 poor PO intake and cardiorenal  - trend BUN/Cr, avoid nephrotoxins, and renally dose medications  - Cr now 0.95, BUN/Cr ration still >20 (mildly prerenal) admission Cr 1.22 with baseline 0.7, now downtrending s/p fluids; likely prerenal 2/2 poor PO intake and cardiorenal  - trend BUN/Cr, avoid nephrotoxins, and renally dose medications  - Cr now 1.08, small increase from yesterday, possibly 2/2 diuresis

## 2018-01-02 NOTE — PROGRESS NOTE ADULT - SUBJECTIVE AND OBJECTIVE BOX
SOFIYA Knowles MD, PGY1  Medicine Team 4  Pager: [phone]  After 7pm on weekdays or after noon on weekends: Page coverage at [phone]     NAFISA DARLING     89y     Male  Patient is a 89y old  Male who presents with a chief complaint of abnormal breathing (31 Dec 2017 19:00)       Overnight events:  No acute events overnight. Pt is awake today and more aware of his surroundings. States he is hungry and "hasn't eaten for 3 days" (per nursing ate last night). States he wants to go home but c/o abdominal discomfort. A&O x 3.       REVIEW OF SYSTEMS:  General: No fever or fatigue.   CV: No chest pain or palpitations.  Pulm: + mild shortness of breath, wheezing, or coughing.  Abd: + abdominal pain, no nausea, vomiting, diarrhea, or constipation.   Neuro: No headache, dizziness, lightheadedness, or weakness.   Skin: No rashes.       MEDICATIONS  (STANDING):  ALBUTerol/ipratropium for Nebulization 3 milliLiter(s) Nebulizer every 6 hours  ampicillin/sulbactam  IVPB      ampicillin/sulbactam  IVPB 3 Gram(s) IV Intermittent every 12 hours  azithromycin  IVPB      azithromycin  IVPB 500 milliGRAM(s) IV Intermittent every 24 hours  dextrose 5%. 1000 milliLiter(s) (50 mL/Hr) IV Continuous <Continuous>  dextrose 50% Injectable 12.5 Gram(s) IV Push once  dextrose 50% Injectable 25 Gram(s) IV Push once  dextrose 50% Injectable 25 Gram(s) IV Push once  enoxaparin Injectable 40 milliGRAM(s) SubCutaneous every 24 hours  famotidine Injectable 20 milliGRAM(s) IV Push every 12 hours  insulin lispro (HumaLOG) corrective regimen sliding scale   SubCutaneous three times a day before meals  insulin lispro (HumaLOG) corrective regimen sliding scale   SubCutaneous at bedtime  levothyroxine Injectable 87 MICROGram(s) IV Push <User Schedule>  pantoprazole  Injectable 40 milliGRAM(s) IV Push daily    MEDICATIONS  (PRN):  artificial  tears Solution 1 Drop(s) Both EYES every 6 hours PRN Dry Eyes  dextrose Gel 1 Dose(s) Oral once PRN Blood Glucose LESS THAN 70 milliGRAM(s)/deciliter  glucagon  Injectable 1 milliGRAM(s) IntraMuscular once PRN Glucose LESS THAN 70 milligrams/deciliter      VITAL SIGNS:  T(C): 36.3 (18 @ 06:20), Max: 37.1 (18 @ 13:51)  T(F): 97.3 (18 @ 06:20), Max: 98.8 (18 @ 13:51)  HR: 81 (18 @ 06:20) (72 - 84)  BP: 94/67 (18 @ 06:20) (90/60 - 94/67)  RR: 20 (18 @ 06:20) (18 - 20)  SpO2: 100% (18 @ 06:20) (97% - 100%)  Daily Weight in k.4 (2018 06:20)     @ 07:01  -   @ 07:00  --------------------------------------------------------  IN: 100 mL / OUT: 1 mL / NET: 99 mL      CAPILLARY BLOOD GLUCOSE      POCT Blood Glucose.: 156 mg/dL (2018 22:34)  POCT Blood Glucose.: 267 mg/dL (2018 17:31)  POCT Blood Glucose.: 224 mg/dL (2018 12:29)    I&O's Summary    2018 07:01  -  2018 07:00  --------------------------------------------------------  IN: 100 mL / OUT: 1 mL / NET: 99 mL    PHYSICAL EXAM:   GENERAL: awake, alert, well-developed, appears well-nourished   HEAD: Atraumatic, Normocephalic; dry MM   EARS: L ear deaf, R ear [place]  EYES: EOMI, conjunctiva and sclera clear  NECK: Supple, No JVD  CHEST/LUNG: Bibasilar crackles, agonal breathing, normal breathing rate, diffusely transmitted upper respiratory sounds  HEART: Regular rate and rhythm; No murmurs, rubs, or gallops  ABDOMEN: Soft, Nontender, mildy Distended; Bowel sounds present  EXTREMITIES:  2+ Peripheral Pulses, No clubbing, cyanosis, mild LE edema  PSYCH: affect is anxious/ upset  SKIN: B/L pretibial abrasions      LABS:                        9.8    10.77 )-----------( 132      ( 2018 07:25 )             31.1         136  |  101  |  27<H>  ----------------------------<  204<H>  4.7   |  24  |  0.95    Ca    9.5      2018 06:20  Phos  3.3       Mg     2.3         TPro  6.9  /  Alb  3.6  /  TBili  0.3  /  DBili  x   /  AST  33  /  ALT  28  /  AlkPhos  109      PT/INR - ( 31 Dec 2017 12:00 )   PT: 13.2 SEC;   INR: 1.19          PTT - ( 31 Dec 2017 12:00 )  PTT:28.3 SEC  CARDIAC MARKERS ( 31 Dec 2017 16:15 )  x     / 0.16 ng/mL / x     / x     / x      CARDIAC MARKERS ( 31 Dec 2017 12:00 )  x     / 0.18 ng/mL / 84 u/L / 4.65 ng/mL / x          Urinalysis Basic - ( 31 Dec 2017 16:15 )    Color: YELLOW / Appearance: HAZY / S.028 / pH: 6.0  Gluc: >1000 / Ketone: NEGATIVE  / Bili: NEGATIVE / Urobili: NORMAL mg/dL   Blood: NEGATIVE / Protein: 30 mg/dL / Nitrite: NEGATIVE   Leuk Esterase: SMALL / RBC: 2-5 / WBC 2-5   Sq Epi: OCC / Non Sq Epi: x / Bacteria: x        RADIOLOGY & ADDITIONAL TESTS:    Imaging Personally Reviewed:  Consultant(s) Notes Reviewed:    Care Discussed with Consultants/Other Providers: SOFIYA Knowles MD, PGY1  Medicine Team 4  Pager: [phone]  After 7pm on weekdays or after noon on weekends: Page coverage at [phone]     NAFISA DARLING     89y     Male  Patient is a 89y old  Male who presents with a chief complaint of abnormal breathing (31 Dec 2017 19:00)       Overnight events:  No acute events overnight. Pt is awake today and more aware of his surroundings. States he is hungry and "hasn't eaten for 3 days" (per nursing ate last night). States he wants to go home but c/o abdominal discomfort. A&O x 3.       REVIEW OF SYSTEMS:  General: No fever or fatigue.   CV: No chest pain or palpitations.  Pulm: + mild shortness of breath, wheezing, or coughing.  Abd: + abdominal pain, no nausea, vomiting, diarrhea, or constipation.   Neuro: No headache, dizziness, lightheadedness, or weakness.   Skin: No rashes.       MEDICATIONS  (STANDING):  ALBUTerol/ipratropium for Nebulization 3 milliLiter(s) Nebulizer every 6 hours  ampicillin/sulbactam  IVPB      ampicillin/sulbactam  IVPB 3 Gram(s) IV Intermittent every 12 hours  azithromycin  IVPB      azithromycin  IVPB 500 milliGRAM(s) IV Intermittent every 24 hours  dextrose 5%. 1000 milliLiter(s) (50 mL/Hr) IV Continuous <Continuous>  dextrose 50% Injectable 12.5 Gram(s) IV Push once  dextrose 50% Injectable 25 Gram(s) IV Push once  dextrose 50% Injectable 25 Gram(s) IV Push once  enoxaparin Injectable 40 milliGRAM(s) SubCutaneous every 24 hours  famotidine Injectable 20 milliGRAM(s) IV Push every 12 hours  insulin lispro (HumaLOG) corrective regimen sliding scale   SubCutaneous three times a day before meals  insulin lispro (HumaLOG) corrective regimen sliding scale   SubCutaneous at bedtime  levothyroxine Injectable 87 MICROGram(s) IV Push <User Schedule>  pantoprazole  Injectable 40 milliGRAM(s) IV Push daily    MEDICATIONS  (PRN):  artificial  tears Solution 1 Drop(s) Both EYES every 6 hours PRN Dry Eyes  dextrose Gel 1 Dose(s) Oral once PRN Blood Glucose LESS THAN 70 milliGRAM(s)/deciliter  glucagon  Injectable 1 milliGRAM(s) IntraMuscular once PRN Glucose LESS THAN 70 milligrams/deciliter      VITAL SIGNS:  T(C): 36.3 (18 @ 06:20), Max: 37.1 (18 @ 13:51)  T(F): 97.3 (18 @ 06:20), Max: 98.8 (18 @ 13:51)  HR: 81 (18 @ 06:20) (72 - 84)  BP: 94/67 (18 @ 06:20) (90/60 - 94/67)  RR: 20 (18 @ 06:20) (18 - 20)  SpO2: 100% (18 @ 06:20) (97% - 100%)  Daily Weight in k.4 (2018 06:20)     @ 07:01  -   @ 07:00  --------------------------------------------------------  IN: 100 mL / OUT: 1 mL / NET: 99 mL      CAPILLARY BLOOD GLUCOSE      POCT Blood Glucose.: 156 mg/dL (2018 22:34)  POCT Blood Glucose.: 267 mg/dL (2018 17:31)  POCT Blood Glucose.: 224 mg/dL (2018 12:29)    I&O's Summary    2018 07:01  -  2018 07:00  --------------------------------------------------------  IN: 100 mL / OUT: 1 mL / NET: 99 mL    PHYSICAL EXAM:   GENERAL: awake, alert, well-developed, appears well-nourished   HEAD: Atraumatic, Normocephalic; dry MM   EARS: L ear deaf, 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[place]  EYES: EOMI, conjunctiva and sclera clear  NECK: Supple, No JVD  CHEST/LUNG: Bibasilar crackles, agonal breathing, normal breathing rate, diffusely transmitted upper respiratory sounds  HEART: Regular rate and rhythm; No murmurs, rubs, or gallops  ABDOMEN: Soft, Nontender, mildy Distended; Bowel sounds present  EXTREMITIES:  2+ Peripheral Pulses, No clubbing, cyanosis, mild LE edema  PSYCH: affect is anxious/ upset  SKIN: B/L pretibial abrasions      LABS:                                     9.8    10.77 )-----------( 132      ( 2018 07:25 )             31.1       01-    139  |  100  |  28<H>  ----------------------------<  168<H>  4.2   |  22  |  1.08    Ca    9.4      2018 07:00  Phos  3.0     -  Mg     2.3         TPro  6.9  /  Alb  3.6  /  TBili  0.3  /  DBili  x   /  AST  33  /  ALT  28  /  AlkPhos  109  -              Urinalysis Basic - ( 31 Dec 2017 16:15 )    Color: YELLOW / Appearance: HAZY / S.028 / pH: 6.0  Gluc: >1000 / Ketone: NEGATIVE  / Bili: NEGATIVE / Urobili: NORMAL mg/dL   Blood: NEGATIVE / Protein: 30 mg/dL / Nitrite: NEGATIVE   Leuk Esterase: SMALL / RBC: 2-5 / WBC 2-5   Sq Epi: OCC / Non Sq Epi: x / Bacteria: x      Lactate Trend       6.7--> 5.7--> 5.2 --> 2.7      CARDIAC MARKERS ( 31 Dec 2017 16:15 )  x     / 0.16 ng/mL / x     / x     / x          CAPILLARY BLOOD GLUCOSE      POCT Blood Glucose.: 253 mg/dL (2018 12:38)  POCT Blood Glucose.: 183 mg/dL (2018 09:06)  POCT Blood Glucose.: 156 mg/dL (2018 22:34)  POCT Blood Glucose.: 267 mg/dL (2018 17:31)      Imaging Personally Reviewed:  Consultant(s) Notes Reviewed:    Care Discussed with Consultants/Other Providers:

## 2018-01-03 VITALS
TEMPERATURE: 97 F | RESPIRATION RATE: 35 BRPM | HEART RATE: 94 BPM | DIASTOLIC BLOOD PRESSURE: 75 MMHG | SYSTOLIC BLOOD PRESSURE: 108 MMHG | OXYGEN SATURATION: 96 %

## 2018-01-03 DIAGNOSIS — E11.9 TYPE 2 DIABETES MELLITUS WITHOUT COMPLICATIONS: ICD-10-CM

## 2018-01-03 LAB
BASOPHILS # BLD AUTO: 0.03 K/UL — SIGNIFICANT CHANGE UP (ref 0–0.2)
BASOPHILS NFR BLD AUTO: 0.3 % — SIGNIFICANT CHANGE UP (ref 0–2)
BUN SERPL-MCNC: 38 MG/DL — HIGH (ref 7–23)
CALCIUM SERPL-MCNC: 9.4 MG/DL — SIGNIFICANT CHANGE UP (ref 8.4–10.5)
CHLORIDE SERPL-SCNC: 100 MMOL/L — SIGNIFICANT CHANGE UP (ref 98–107)
CO2 SERPL-SCNC: 23 MMOL/L — SIGNIFICANT CHANGE UP (ref 22–31)
CREAT SERPL-MCNC: 1.18 MG/DL — SIGNIFICANT CHANGE UP (ref 0.5–1.3)
EOSINOPHIL # BLD AUTO: 0.02 K/UL — SIGNIFICANT CHANGE UP (ref 0–0.5)
EOSINOPHIL NFR BLD AUTO: 0.2 % — SIGNIFICANT CHANGE UP (ref 0–6)
GLUCOSE SERPL-MCNC: 160 MG/DL — HIGH (ref 70–99)
HCT VFR BLD CALC: 29.9 % — LOW (ref 39–50)
HGB BLD-MCNC: 9.7 G/DL — LOW (ref 13–17)
IMM GRANULOCYTES # BLD AUTO: 0.17 # — SIGNIFICANT CHANGE UP
IMM GRANULOCYTES NFR BLD AUTO: 1.5 % — SIGNIFICANT CHANGE UP (ref 0–1.5)
LYMPHOCYTES # BLD AUTO: 1.73 K/UL — SIGNIFICANT CHANGE UP (ref 1–3.3)
LYMPHOCYTES # BLD AUTO: 15.6 % — SIGNIFICANT CHANGE UP (ref 13–44)
MAGNESIUM SERPL-MCNC: 2.5 MG/DL — SIGNIFICANT CHANGE UP (ref 1.6–2.6)
MCHC RBC-ENTMCNC: 30.9 PG — SIGNIFICANT CHANGE UP (ref 27–34)
MCHC RBC-ENTMCNC: 32.4 % — SIGNIFICANT CHANGE UP (ref 32–36)
MCV RBC AUTO: 95.2 FL — SIGNIFICANT CHANGE UP (ref 80–100)
MONOCYTES # BLD AUTO: 1.84 K/UL — HIGH (ref 0–0.9)
MONOCYTES NFR BLD AUTO: 16.5 % — HIGH (ref 2–14)
NEUTROPHILS # BLD AUTO: 7.33 K/UL — SIGNIFICANT CHANGE UP (ref 1.8–7.4)
NEUTROPHILS NFR BLD AUTO: 65.9 % — SIGNIFICANT CHANGE UP (ref 43–77)
NRBC # FLD: 0 — SIGNIFICANT CHANGE UP
PHOSPHATE SERPL-MCNC: 3.5 MG/DL — SIGNIFICANT CHANGE UP (ref 2.5–4.5)
PLATELET # BLD AUTO: 138 K/UL — LOW (ref 150–400)
PMV BLD: 12.9 FL — SIGNIFICANT CHANGE UP (ref 7–13)
POTASSIUM SERPL-MCNC: 4.1 MMOL/L — SIGNIFICANT CHANGE UP (ref 3.5–5.3)
POTASSIUM SERPL-SCNC: 4.1 MMOL/L — SIGNIFICANT CHANGE UP (ref 3.5–5.3)
RBC # BLD: 3.14 M/UL — LOW (ref 4.2–5.8)
RBC # FLD: 15.6 % — HIGH (ref 10.3–14.5)
SODIUM SERPL-SCNC: 139 MMOL/L — SIGNIFICANT CHANGE UP (ref 135–145)
WBC # BLD: 11.12 K/UL — HIGH (ref 3.8–10.5)
WBC # FLD AUTO: 11.12 K/UL — HIGH (ref 3.8–10.5)

## 2018-01-03 PROCEDURE — 71045 X-RAY EXAM CHEST 1 VIEW: CPT | Mod: 26

## 2018-01-03 PROCEDURE — 99233 SBSQ HOSP IP/OBS HIGH 50: CPT | Mod: GC

## 2018-01-03 RX ORDER — FUROSEMIDE 40 MG
40 TABLET ORAL ONCE
Qty: 0 | Refills: 0 | Status: COMPLETED | OUTPATIENT
Start: 2018-01-03 | End: 2018-01-03

## 2018-01-03 RX ORDER — IPRATROPIUM/ALBUTEROL SULFATE 18-103MCG
3 AEROSOL WITH ADAPTER (GRAM) INHALATION ONCE
Qty: 0 | Refills: 0 | Status: COMPLETED | OUTPATIENT
Start: 2018-01-03 | End: 2018-01-03

## 2018-01-03 RX ORDER — PIPERACILLIN AND TAZOBACTAM 4; .5 G/20ML; G/20ML
3.38 INJECTION, POWDER, LYOPHILIZED, FOR SOLUTION INTRAVENOUS ONCE
Qty: 0 | Refills: 0 | Status: COMPLETED | OUTPATIENT
Start: 2018-01-03 | End: 2018-01-03

## 2018-01-03 RX ORDER — PIPERACILLIN AND TAZOBACTAM 4; .5 G/20ML; G/20ML
3.38 INJECTION, POWDER, LYOPHILIZED, FOR SOLUTION INTRAVENOUS EVERY 8 HOURS
Qty: 0 | Refills: 0 | Status: DISCONTINUED | OUTPATIENT
Start: 2018-01-03 | End: 2018-01-03

## 2018-01-03 RX ORDER — FUROSEMIDE 40 MG
20 TABLET ORAL ONCE
Qty: 0 | Refills: 0 | Status: COMPLETED | OUTPATIENT
Start: 2018-01-03 | End: 2018-01-03

## 2018-01-03 RX ADMIN — ENOXAPARIN SODIUM 40 MILLIGRAM(S): 100 INJECTION SUBCUTANEOUS at 21:09

## 2018-01-03 RX ADMIN — Medication 3 MILLILITER(S): at 09:11

## 2018-01-03 RX ADMIN — PANTOPRAZOLE SODIUM 40 MILLIGRAM(S): 20 TABLET, DELAYED RELEASE ORAL at 08:10

## 2018-01-03 RX ADMIN — Medication 3 MILLILITER(S): at 04:16

## 2018-01-03 RX ADMIN — Medication: at 21:44

## 2018-01-03 RX ADMIN — PIPERACILLIN AND TAZOBACTAM 200 GRAM(S): 4; .5 INJECTION, POWDER, LYOPHILIZED, FOR SOLUTION INTRAVENOUS at 19:52

## 2018-01-03 RX ADMIN — Medication 5: at 18:11

## 2018-01-03 RX ADMIN — Medication 3 MILLILITER(S): at 15:24

## 2018-01-03 RX ADMIN — Medication 81 MILLIGRAM(S): at 08:11

## 2018-01-03 RX ADMIN — Medication 3 MILLILITER(S): at 18:29

## 2018-01-03 RX ADMIN — FAMOTIDINE 20 MILLIGRAM(S): 10 INJECTION INTRAVENOUS at 08:09

## 2018-01-03 RX ADMIN — Medication 40 MILLIGRAM(S): at 20:34

## 2018-01-03 RX ADMIN — AZITHROMYCIN 250 MILLIGRAM(S): 500 TABLET, FILM COATED ORAL at 18:28

## 2018-01-03 RX ADMIN — Medication 20 MILLIGRAM(S): at 11:30

## 2018-01-03 RX ADMIN — Medication 2: at 13:09

## 2018-01-03 RX ADMIN — Medication 1: at 09:02

## 2018-01-03 RX ADMIN — FAMOTIDINE 20 MILLIGRAM(S): 10 INJECTION INTRAVENOUS at 17:10

## 2018-01-03 RX ADMIN — Medication 175 MICROGRAM(S): at 08:09

## 2018-01-03 NOTE — SWALLOW BEDSIDE ASSESSMENT ADULT - SWALLOW EVAL: DIAGNOSIS
1. The patient demonstrated a mild oral dysphagia for puree and honey thick liquid textures marked by delayed bolus collection, transfer, and posterior transport. 2. The patient demonstrated a moderate oral dysphagia for nectar thick liquid textures marked by delayed bolus collection and transfer with suspected posterior loss over the base of tongue. 3. The patient demonstrated a mild pharyngeal dysphagia for puree and honey thick liquid textures marked by delayed pharyngeal swallow trigger with reduced hyolaryngeal elevation and reduced swallow intensity upon digital palpation without any evidence of airway penetration. 4. The patient demonstrated a severe pharyngeal dysphagia for nectar thick liquids marked by delayed pharyngeal swallow trigger with reduced hyolaryngeal elevation and reduced swallow intensity upon digital palpation resulting in reflexive coughing and increased WOB suggesting possible airway penetration.

## 2018-01-03 NOTE — SWALLOW BEDSIDE ASSESSMENT ADULT - ASR SWALLOW ASPIRATION MONITOR
upper respiratory infection/pneumonia/change of breathing pattern/cough/gurgly voice/oral hygiene/position upright (89Y)/fever/throat clearing

## 2018-01-03 NOTE — SWALLOW BEDSIDE ASSESSMENT ADULT - COMMENTS
The patient was seen this AM for initial assessment of swallow function, at which time he was alert and cooperative. Patient currently with supplemental O2 via NC in place. Patient's HHA at bedside throughout this evaluation and reporting the patient was consuming a regular solid with thin liquid diet prior to this admission, however, patient had been exhibiting coughing when eating/drinking and ate at a very slow rate. Per charting, the patient is a 89 y/o M with "CAD s/p CABG x4 (1988) c/b HFrEF (5/2013 TTE EF 40-45%, mild MR, mod AS, mod LA, reversal of E-A, mod TR), HTN, HLD, hypothyroid, colon CA s/p resection, and internal hemorrhoids." He was brought in by daughter for labored breathing found to be in sepsis likely 2/2 viral infection despite RVP(-) c/b acute on chronic HFrEF exacerbation. Recent CT of the chest revealed, "Interlobular septal thickening and bilateral pleural effusions, suggesting mild pulmonary edema." Discussed results and recommendations from this evaluation with the patient, patient's HHA, RN, and call out to MD.

## 2018-01-03 NOTE — DISCHARGE NOTE FOR THE EXPIRED PATIENT - SECONDARY DIAGNOSIS.
Acute on chronic combined systolic and diastolic CHF (congestive heart failure) Left bundle branch block Diabetes mellitus Pneumonia Sepsis, viral

## 2018-01-03 NOTE — PROGRESS NOTE ADULT - PROBLEM SELECTOR PLAN 1
in setting of h/o CAD s/p CABG x4 in 1988 with pulmonary edema on CT chest and proBNP 27,000  - satting % on NC 2L; c/w supplemental O2 PRN. Check O2 sat on RA  - soft BP, holding home antihypertensives  - strict I/Os (may need condom cath) daily weights  - diuresing carefully PRN given low BP  - appreciate cards recs in setting of h/o CAD s/p CABG x4 in 1988 with pulmonary edema on CT chest and proBNP 27,000  - Stat dose 20 mg lasix IV this AM given lung exam w/ evidence of fluid overload (also up 1 kG since admission)  - currently on 3 L NC and satting 100%. will check O2 sat on RA after IV lasix, ABG later today   - soft BP (improving somewhat), holding home antihypertensives  - strict I/Os, daily weights  - diuresing carefully PRN given low BP (but has baseline low BP per HHA) in setting of h/o CAD s/p CABG x4 in 1988 with pulmonary edema on CT chest and proBNP 27,000  - Stat dose 20 mg lasix IV this AM given lung exam w/ evidence of fluid overload (also up 1 kG since admission)  - currently satting 98-99% on RA after IV lasix  - soft BP (improving somewhat), holding home antihypertensives  - strict I/Os, daily weights  - diuresing carefully PRN given low BP (but has baseline low BP per HHA)

## 2018-01-03 NOTE — PROGRESS NOTE ADULT - SUBJECTIVE AND OBJECTIVE BOX
SOFIYA Knowles MD, PGY1  Medicine Team 4  Pager: [phone]  After 7pm on weekdays or after noon on weekends: Page coverage at [phone]     NAFISA DARLING     89y     Male  Patient is a 89y old  Male who presents with a chief complaint of abnormal breathing (31 Dec 2017 19:00)       Overnight events:    REVIEW OF SYSTEMS:  General: No fever or fatigue.   CV: No chest pain or palpitations.  Pulm: No shortness of breath, wheezing, or coughing.  Abd: No abdominal pain, nausea, vomiting, diarrhea, or constipation.   Neuro: No headache, dizziness, lightheadedness, or weakness.   Skin: No rashes.     MEDICATIONS  (STANDING):  ALBUTerol/ipratropium for Nebulization 3 milliLiter(s) Nebulizer every 6 hours  aspirin enteric coated 81 milliGRAM(s) Oral daily  azithromycin  IVPB      azithromycin  IVPB 500 milliGRAM(s) IV Intermittent every 24 hours  cefTRIAXone   IVPB 1 Gram(s) IV Intermittent every 24 hours  dextrose 5%. 1000 milliLiter(s) (50 mL/Hr) IV Continuous <Continuous>  dextrose 50% Injectable 12.5 Gram(s) IV Push once  dextrose 50% Injectable 25 Gram(s) IV Push once  dextrose 50% Injectable 25 Gram(s) IV Push once  enoxaparin Injectable 40 milliGRAM(s) SubCutaneous every 24 hours  famotidine    Tablet 20 milliGRAM(s) Oral two times a day  insulin lispro (HumaLOG) corrective regimen sliding scale   SubCutaneous three times a day before meals  insulin lispro (HumaLOG) corrective regimen sliding scale   SubCutaneous at bedtime  levothyroxine 175 MICROGram(s) Oral daily  pantoprazole    Tablet 40 milliGRAM(s) Oral before breakfast    MEDICATIONS  (PRN):  artificial  tears Solution 1 Drop(s) Both EYES every 6 hours PRN Dry Eyes  dextrose Gel 1 Dose(s) Oral once PRN Blood Glucose LESS THAN 70 milliGRAM(s)/deciliter  glucagon  Injectable 1 milliGRAM(s) IntraMuscular once PRN Glucose LESS THAN 70 milligrams/deciliter      VITAL SIGNS:  T(C): 36.3 (18 @ 06:49), Max: 36.3 (18 @ 13:42)  T(F): 97.4 (18 @ 06:49), Max: 97.4 (18 @ 13:42)  HR: 77 (18 @ 07:59) (72 - 88)  BP: 100/74 (18 @ 07:59) (96/69 - 108/72)  RR: 20 (18 @ 07:59) (18 - 20)  SpO2: 100% (18 @ 07:59) (94% - 100%)  Wt(kg): --  Daily     Daily Weight in k (2018 06:49)     @ 07:01  -   @ 07:00  --------------------------------------------------------  IN: 290 mL / OUT: 125 mL / NET: 165 mL     @ 07:  -   @ 08:19  --------------------------------------------------------  IN: 30 mL / OUT: 0 mL / NET: 30 mL          CAPILLARY BLOOD GLUCOSE      POCT Blood Glucose.: 313 mg/dL (2018 21:28)  POCT Blood Glucose.: 271 mg/dL (2018 17:31)  POCT Blood Glucose.: 253 mg/dL (2018 12:38)  POCT Blood Glucose.: 183 mg/dL (2018 09:06)          PHYSICAL EXAM  GENERAL: NAD, well-developed  HEAD:  Atraumatic, Normocephalic  EYES: EOMI, PERRLA, conjunctiva and sclera clear  NECK: Supple, No JVD  CHEST/LUNG: Clear to auscultation bilaterally; No wheeze  HEART: Regular rate and rhythm; No murmurs, rubs, or gallops  ABDOMEN: Soft, Nontender, Nondistended; Bowel sounds present  EXTREMITIES:  2+ Peripheral Pulses, No clubbing, cyanosis, or edema  PSYCH: AAOx3  SKIN: No rashes or lesions    LABS:                        9.7    11.12 )-----------( 138      ( 2018 07:00 )             29.9     01-03    139  |  100  |  38<H>  ----------------------------<  160<H>  4.1   |  23  |  1.18    Ca    9.4      2018 07:00  Phos  3.5     01-03  Mg     2.5     01-03          RADIOLOGY & ADDITIONAL TESTS:    Imaging Personally Reviewed:  Consultant(s) Notes Reviewed:    Care Discussed with Consultants/Other Providers: SOFIYA Knowles MD, PGY1  Medicine Team 4  Pager: [phone]  After 7pm on weekdays or after noon on weekends: Page coverage at [phone]     NAFISA DARLING     89y     Male  Patient is a 89y old  Male who presents with a chief complaint of abnormal breathing (31 Dec 2017 19:00)       Overnight events:  No acute overnight events. Pt sleepy this AM by per HHA Marques was conversant earlier this AM. Pt appears somewhat uncomfortable, still belly breathing. Has been eating well.       MEDICATIONS  (STANDING):  ALBUTerol/ipratropium for Nebulization 3 milliLiter(s) Nebulizer every 6 hours  aspirin enteric coated 81 milliGRAM(s) Oral daily  azithromycin  IVPB      azithromycin  IVPB 500 milliGRAM(s) IV Intermittent every 24 hours  cefTRIAXone   IVPB 1 Gram(s) IV Intermittent every 24 hours  dextrose 5%. 1000 milliLiter(s) (50 mL/Hr) IV Continuous <Continuous>  dextrose 50% Injectable 12.5 Gram(s) IV Push once  dextrose 50% Injectable 25 Gram(s) IV Push once  dextrose 50% Injectable 25 Gram(s) IV Push once  enoxaparin Injectable 40 milliGRAM(s) SubCutaneous every 24 hours  famotidine    Tablet 20 milliGRAM(s) Oral two times a day  insulin lispro (HumaLOG) corrective regimen sliding scale   SubCutaneous three times a day before meals  insulin lispro (HumaLOG) corrective regimen sliding scale   SubCutaneous at bedtime  levothyroxine 175 MICROGram(s) Oral daily  pantoprazole    Tablet 40 milliGRAM(s) Oral before breakfast    MEDICATIONS  (PRN):  artificial  tears Solution 1 Drop(s) Both EYES every 6 hours PRN Dry Eyes  dextrose Gel 1 Dose(s) Oral once PRN Blood Glucose LESS THAN 70 milliGRAM(s)/deciliter  glucagon  Injectable 1 milliGRAM(s) IntraMuscular once PRN Glucose LESS THAN 70 milligrams/deciliter      VITAL SIGNS:  T(C): 36.3 (18 @ 06:49), Max: 36.3 (18 @ 13:42)  T(F): 97.4 (18 @ 06:49), Max: 97.4 (18 @ 13:42)  HR: 77 (18 @ 07:59) (72 - 88)  BP: 100/74 (18 @ 07:59) (96/69 - 108/72)  RR: 20 (18 @ 07:59) (18 - 20)  SpO2: 100% (18 @ 07:59) (94% - 100%)    Daily Weight in k (2018 06:49)     @ 07:01  -   @ 07:00  --------------------------------------------------------  IN: 290 mL / OUT: 125 mL / NET: 165 mL     @ 07:  -   @ 08:19  --------------------------------------------------------  IN: 30 mL / OUT: 0 mL / NET: 30 mL    CAPILLARY BLOOD GLUCOSE    POCT Blood Glucose.: 313 mg/dL (2018 21:28)  POCT Blood Glucose.: 271 mg/dL (2018 17:31)  POCT Blood Glucose.: 253 mg/dL (2018 12:38)  POCT Blood Glucose.: 183 mg/dL (2018 09:06)      PHYSICAL EXAM  GENERAL:  well-developed, adequate hygiene, sleeping and difficult to arouse   HEAD:  Atraumatic, Normocephalic  EYES: conjunctiva and sclera clear  NECK: Supple, No JVD  BACK: significant kyphosis  CHEST/LUNG: Decreased air movement, expiratory wheeze, Rales/ crackles b/l  HEART: Regular rate and rhythm; No murmurs, rubs, or gallops  ABDOMEN: Soft, Nontender, Nondistended; Bowel sounds present  EXTREMITIES:  2+ Peripheral Pulses, No clubbing, cyanosis, or edema  PSYCH: unable to assess  SKIN: pretibial bruising and abrasions b/l    LABS:                        9.7    11.12 )-----------( 138      ( 2018 07:00 )             29.9         139  |  100  |  38<H>  ----------------------------<  160<H>  4.1   |  23  |  1.18    Ca    9.4      2018 07:00  Phos  3.5     01-03  Mg     2.5     -    Weights:  71-->71.4--> 72 kG       RADIOLOGY & ADDITIONAL TESTS:    Imaging Personally Reviewed:  Consultant(s) Notes Reviewed:    Care Discussed with Consultants/Other Providers:

## 2018-01-03 NOTE — PROGRESS NOTE ADULT - PROBLEM SELECTOR PLAN 5
new LBBB in setting of h/o CAD s/p CABG  - per Cards consult: no ACS work up as this time as troponins elevated in setting of sepsis (demand ischemia)  - troponins now downtrending new LBBB in setting of h/o CAD s/p CABG  - per Cards consult: no ACS work up as this time as troponins elevated in setting of sepsis (demand ischemia)  - troponins downtrended  - no need for acute intervention at this time  - now off tele

## 2018-01-03 NOTE — PROGRESS NOTE ADULT - ASSESSMENT
89M with CAD s/p CABG x4 (1988) c/b HFrEF (5/2013 TTE EF 40-45%, mild MR, mod AS, mod LA, reversal of E-A, mod TR), HTN, HLD, hypothyroid, colon CA s/p resection, internal hemorrhoids who was BIB daughter for labored breathing, found to be in sepsis likely 2/2 viral infection despite RVP(-) c/b acute on chronic HFrEF exacerbation.

## 2018-01-03 NOTE — PROGRESS NOTE ADULT - PROBLEM SELECTOR PLAN 4
- improvement in mental status from admission, alert, oriented, conversant  - change in baseline MS likely 2/2 infection, hyperglycemia, KIRTI  - treat underlying causes, monitor mental status    # functional quadriplegia  due to current medical condition, baseline wheelchair bound - improvement in mental status from admission but still intermittently very sleepy  - change in baseline MS likely 2/2 infection, hyperglycemia, KIRTI  - treat underlying causes, monitor mental status    # functional quadriplegia  due to current medical condition, baseline wheelchair bound

## 2018-01-03 NOTE — PROGRESS NOTE ADULT - PROBLEM SELECTOR PLAN 6
Likely exacerbated by sepsis  Impoved  Started Lantus 3U hs today & will continue ISS Likely exacerbated by sepsis  Improved  Started Lantus 3U & will continue ISS admission Cr 1.22 with baseline 0.7, now uptrending likely 2/2 diuresis  - trend BUN/Cr, avoid nephrotoxins, and renally dose medications  - Cr now 1.18 today

## 2018-01-03 NOTE — PROGRESS NOTE ADULT - PROBLEM SELECTOR PLAN 2
- Although RVP(-), no overt evidence of bacterial infection and so likely viral etiology of sepsis  - received vanc and zosyn in ED; c/w empiric abx for viral CAP w/ azithromycin and ceftriaxone (called PMD who clarified that has given keflex in past w/ no issues)  - monitor VS per routine given pt more clinically stable  - currently still requiring O2 via nasal cannula, titrating down - Although RVP neg, no overt evidence of bacterial infection and so likely viral etiology of sepsis  - received vanc and zosyn in ED; c/w empiric abx for viral CAP w/ azithromycin and ceftriaxone (called PMD who clarified that has given keflex in past w/ no issues)  - monitor VS per routine given pt more clinically stable  - evaluating O2 sat on room air today + ABG on room air   - coughing up phlegm but per HHA is baseline, likely due to kyphosis - Although RVP neg, no overt evidence of bacterial infection and so likely viral etiology of sepsis  - received vanc and zosyn in ED; c/w empiric abx for viral CAP w/ azithromycin and ceftriaxone (called PMD who clarified that has given keflex in past w/ no issues)  - monitor VS per routine given pt more clinically stable  - evaluating O2 sat on room air today   - coughing up phlegm but per HHA is baseline, likely due to kyphosis

## 2018-01-03 NOTE — CHART NOTE - NSCHARTNOTEFT_GEN_A_CORE
Paged by nurse at 6:04 pm. Alerted that pt drank a glucerna shake (thickened) very quickly and was subsequently found to be tachypneic and wheezing very loudly, gasping for air. Reported that pt was still saturating at 96% on room air but stated he felt like he was choking. Pt's daughter and HHA at bedside feeding him.     Pt seen and examined. Tachypneic to 36, audible wheezing b/l, + coarse crackles R>L. Pt very distressed, screaming. Sat 95% on RA.     Administered NC, ordered urgent CXR + duoneb. Broadened abx coverage to zosyn instead of ceftriaxone. Pt's daughter at bedside, discussed plan to titrate O2 as needed, broaden abx. Discussed possibility of adm opiate for air hunger/ dyspnea if pt continues to appear very uncomfortable; daughter to think it over and open to it if significant dyspnea persists. Daughter wanting to avoid Bipap if possible given experience w/ her mother being uncomfortable on bipap prior to her passing in November. Night coverage and MAR aware of pt. Pt's daughter comfortable w/ plan.     SOFIYA Knowles MD, PGY1  Medicine Team 4  Pager: [phone]  After 7pm on weekdays or after noon on weekends: Page coverage at [phone]     Addendum: cxr w/ significant edema + new RLL opacity. Given adequate BP, pushing 40 mg IV lasix. Night coverage to f/u on pt. RR down to 24, pt in less distress.

## 2018-01-03 NOTE — PROGRESS NOTE ADULT - ATTENDING COMMENTS
I personally saw and examined the patient.  Discussed with the resident physician and agree with the resident's findings and plan as documented above.
Patient was seen and examined personally by me. I have discussed the plan and reviewed/edited the resident's note and agree with the above physical exam findings including assessment and plan.
I personally saw and examined the patient.  Discussed with the resident physician and agree with the resident's findings and plan as documented above.  D/w pt and daughter at bedside.

## 2018-01-03 NOTE — PROGRESS NOTE ADULT - PROBLEM SELECTOR PLAN 3
although RVP(-), likely viral infection as no overt evidence of bacterial infection with leukocytosis, tachypnea 23, hypotension ON , lactate 6.7 with lactic acidosis on admission  - improvement in RR to 16-20  - monitor WBC  - cultures negative @ 48 hr   - trend lactate (downtrending), from VB.7 (12.31 @ 12:00p)--> 5.7 (12.31 @ 2p) --> 5.2 (12.31 @ 4:30p)--> 2.7 (01.01 @ 6a)  - holding IVF given vol overload/ CHF although RVP(-), likely viral infection as no overt evidence of bacterial infection with leukocytosis, tachypnea 23, hypotension ON , lactate 6.7 with lactic acidosis on admission  - improvement in RR  - monitor WBC (stable at ~11)  - cultures negative   - trend lactate (downtrending), from VB.7 (12.31 @ 12:00p)--> 5.7 (12.31 @ 2p) --> 5.2 (12.31 @ 4:30p)--> 2.7 (01.01 @ 6a)  - will get ABG this PM  - soft BPs at baseline, trending up

## 2018-01-03 NOTE — PROGRESS NOTE ADULT - PROBLEM SELECTOR PROBLEM 1
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

## 2018-01-03 NOTE — SWALLOW BEDSIDE ASSESSMENT ADULT - ASR SWALLOW LINGUAL MOBILITY
impaired right lateral movement/impaired protrusion/impaired anterior elevation/impaired left lateral movement

## 2018-01-03 NOTE — PROGRESS NOTE ADULT - PROBLEM SELECTOR PLAN 10
Daughter Theresa Estevez is HCP and POA with patient being DNR/DNI with paperwork in the chart. IVF, antibiotics, and other medical management acceptable.

## 2018-01-03 NOTE — SWALLOW BEDSIDE ASSESSMENT ADULT - SWALLOW EVAL: RECOMMENDED FEEDING/EATING TECHNIQUES
alternate food with liquid/small sips/bites/check mouth frequently for oral residue/pocketing/crush medication (when feasible)/allow for swallow between intakes/oral hygiene/position upright (90 degrees)/maintain upright posture during/after eating for 30 mins/no straws

## 2018-01-03 NOTE — SWALLOW BEDSIDE ASSESSMENT ADULT - PHARYNGEAL PHASE
Delayed pharyngeal swallow/Decreased laryngeal elevation Decreased laryngeal elevation/Delayed pharyngeal swallow Delayed pharyngeal swallow/Decreased laryngeal elevation/Cough post oral intake

## 2018-01-03 NOTE — PROGRESS NOTE ADULT - PROBLEM SELECTOR PLAN 9
- Lovenox for DVT ppx  - aspiration and fall precautions: Dysphagia 1 diet - Lovenox for DVT ppx  - aspiration and fall precautions: Dysphagia 1 diet w/ nectar thickened liquids (per S/S)

## 2018-01-05 LAB
BACTERIA BLD CULT: SIGNIFICANT CHANGE UP
BACTERIA BLD CULT: SIGNIFICANT CHANGE UP

## 2020-11-12 NOTE — H&P ADULT - PROBLEM SELECTOR PLAN 9
Daughter Theresa Estevez is HCP and POA with patient being DNR/DNI with paperwork in the chart.  IVF, antibiotics, and other medical management acceptable. - HSQ for DVT ppx in setting of KIRTI  - aspiration and fall precautions  - NPO for now 2/2 lethargy  - PT consult Nsaids Counseling: NSAID Counseling: I discussed with the patient that NSAIDs should be taken with food. Prolonged use of NSAIDs can result in the development of stomach ulcers.  Patient advised to stop taking NSAIDs if abdominal pain occurs.  The patient verbalized understanding of the proper use and possible adverse effects of NSAIDs.  All of the patient's questions and concerns were addressed.

## 2021-04-19 NOTE — PROGRESS NOTE ADULT - PROBLEM SELECTOR PLAN 7
Yes admission Cr 1.22 with baseline 0.7, now downtrending s/p fluids; likely prerenal 2/2 poor PO intake and cardiorenal  - trend BUN/Cr, avoid nephrotoxins, and renally dose medications  - Cr now 1.08, small increase from yesterday, possibly 2/2 diuresis admission Cr 1.22 with baseline 0.7, now uptrending likely 2/2 diuresis  - trend BUN/Cr, avoid nephrotoxins, and renally dose medications  - Cr now 1.08, small increase from yesterday, possibly 2/2 diuresis admission Cr 1.22 with baseline 0.7, now uptrending likely 2/2 diuresis  - trend BUN/Cr, avoid nephrotoxins, and renally dose medications  - Cr now 1.18 today Likely exacerbated by sepsis  Improved  Started Lantus 3U & will continue ISS

## 2021-09-30 NOTE — DISCHARGE NOTE FOR THE EXPIRED PATIENT - HOSPITAL COURSE
normal mood with appropriate affect 89M with CAD s/p CABG x4 (1988) c/b HFrEF (5/2013 TTE EF 40-45%, mild MR, mod AS, mod LA, reversal of E-A, mod TR), HTN, HLD, hypothyroid, colon CA s/p resection, internal hemorrhoids who was brought in by daughter for labored breathing found to be in sepsis likely 2/2 viral infection despite RVP(-) c/b acute on chronic HFrEF exacerbation.  CT scan with pulm edema.  New LBBB in setting of h/o CAD s/p CABG, cards consulted, no ACS work up as this time as troponins elevated in setting of sepsis.  Troponins downtrended over the course of his stay., and was kept on stay. 89M with CAD s/p CABG x4 (1988) c/b HFrEF (5/2013 TTE EF 40-45%, mild MR, mod AS, mod LA, reversal of E-A, mod TR), HTN, HLD, hypothyroid, colon CA s/p resection, internal hemorrhoids who was brought in by daughter for labored breathing found to be in sepsis likely 2/2 viral infection despite RVP(-) c/b acute on chronic HFrEF exacerbation.  CT scan with pulm edema.  New LBBB in setting of h/o CAD s/p CABG, cards consulted, no ACS work up as this time as troponins elevated in setting of sepsis.  Patient was monitored on tele and put on oxygen.  Treated CHF with diuresis, pneumonia with abx and  supportive measures.  at 6 pm on 1/3/18 patient was drinking a glucerna shake and aspirated, subsequently found to be tachypnic and wheezing, gasping for air.  NC administered, CXR and duonebs as well.  ABx coverage broadened.  CXR showed significant edema and new RLL opacity.  40 IV lasix administered.  Around 10 PM called by nurse due to unresponsive patient with lack of pulse.  Patient wasunresponsive to verbal or noxious stimuli.  Pupils are fixed and dilated.  No spontaneous breathing.  Absent heart and breath sounds.  No palpable carotid and radial pulses.    Absent peripheral pulses.  Patient pronounced death at 10:25PM.  Daughter notified autopsy declined

## 2021-11-17 NOTE — PHYSICAL THERAPY INITIAL EVALUATION ADULT - PHYSICAL ASSIST/NONPHYSICAL ASSIST: SUPINE/SIT, REHAB EVAL
84 yo M PMHx HTN, DM II, chronic AFib not on anticoagulation due to falls,  shung chronic HFpEF, JOHANNY presented to ED for evaluation of intermittent dizziness/syncope. Course was further complicated by TOBIAS due to urinary retention and NSVT    Syncope/dizziness  - likely due to orthostatic hypotension due to overdiuresis  - had recent change in diuresis  - still feels dizzy upon standing, counciled on slowly standing up  - recheck orthostatics    Chronic HFrEF  - on Farxiga at home  - c/w toresmide--had dose adjustment in CCU from 20 mg to 40 mg now back down to 20 mg then held due to TOBIAS    NSVT  -multiple runs of v. tach since hospital admission, DCCV 11/8 during PCI  -s/p AICD 11/9/21 per EP  -s/p cath 11/10 with LAD WINSOME stent and balloon angioplasty  - c/w ASA/plasix (d/c ASA after 1mo),metoprolol, amiodarone, torsemide      Overflow incontinence  - bladder scan showed 600 mL -> straight cath not successful  - urology consulted -> barragan placed (11/15) -> 800 cc of urine  - will be discharged home with barragan  - monitor of post obstruction diuresis      Femoral access hematoma  -pt continues to have hematoma with blood degradation over L groin from cath 11/10  -held DVT ppx, restarted 11/12 with expansion on 11/13  -pt vitals stable, will hold DVT ppx until hematoma resolves      TOBIAS on CKD IV  - c/w torsemide  - likely prerenal due to dehydration and decreased PO intake    DVT on hold due to hematoma    #Progress Note Handoff:  Pending (specify): Monitor overnight, Hopeful dc to SNF in next 24-48 hrs dizziness stays at bay  Family discussion: discussed monitoring electrolytes/UO/telemetry monitoring.   Disposition: Home___/SNF___/Other________/Unknown at this time____x____               1 person assist

## 2024-01-16 NOTE — PATIENT PROFILE ADULT. - TRANSFUSION HX COMMENT, PROFILE
Working on revision of lab band to bypass due to GERD.  In person education visit today.  Task list updated and reviewed.  Continue working with dietitians and completing task list   unsure